# Patient Record
Sex: FEMALE | Race: WHITE | NOT HISPANIC OR LATINO | Employment: FULL TIME | URBAN - METROPOLITAN AREA
[De-identification: names, ages, dates, MRNs, and addresses within clinical notes are randomized per-mention and may not be internally consistent; named-entity substitution may affect disease eponyms.]

---

## 2017-07-21 ENCOUNTER — HOSPITAL ENCOUNTER (EMERGENCY)
Facility: HOSPITAL | Age: 22
Discharge: HOME | End: 2017-07-21
Attending: EMERGENCY MEDICINE
Payer: MEDICAID

## 2017-07-21 VITALS
DIASTOLIC BLOOD PRESSURE: 73 MMHG | BODY MASS INDEX: 19.7 KG/M2 | WEIGHT: 130 LBS | TEMPERATURE: 99 F | RESPIRATION RATE: 16 BRPM | HEART RATE: 97 BPM | HEIGHT: 68 IN | SYSTOLIC BLOOD PRESSURE: 140 MMHG | OXYGEN SATURATION: 100 %

## 2017-07-21 DIAGNOSIS — A60.00 GENITAL HERPES SIMPLEX, UNSPECIFIED SITE: Primary | ICD-10-CM

## 2017-07-21 PROCEDURE — 99283 EMERGENCY DEPT VISIT LOW MDM: CPT

## 2017-07-21 RX ORDER — ACYCLOVIR 200 MG/1
200 CAPSULE ORAL
Qty: 50 CAPSULE | Refills: 0 | Status: SHIPPED | OUTPATIENT
Start: 2017-07-21 | End: 2018-05-27

## 2017-07-22 NOTE — ED PROVIDER NOTES
"Encounter Date: 7/21/2017       History     Chief Complaint   Patient presents with    Pelvic Pain     Hx of genital herpes and fears another outbreak.  Just finished with an outbreak and has no medication      Patient is a 21 year old female who presents for herpes outbreak. She reports PMH significant for genital herpes. She states she is currently 5-6 weeks pregnant. She states she recently finished a course of acyclovir "a few weeks ago" and is now having another outbreak. She reports associated tenderness to the area. She denied any abdominal pain, nausea, vomiting, vaginal discharge, vaginal bleeding, dysuria or urinary frequency.       The history is provided by the patient.     Review of patient's allergies indicates:  No Known Allergies  Past Medical History:   Diagnosis Date    Herpes genitalis in women      History reviewed. No pertinent surgical history.  History reviewed. No pertinent family history.  Social History   Substance Use Topics    Smoking status: Never Smoker    Smokeless tobacco: Never Used    Alcohol use No     Review of Systems   Constitutional: Negative for chills and fever.   HENT: Negative for congestion and sore throat.    Respiratory: Negative for cough and shortness of breath.    Cardiovascular: Negative for chest pain.   Gastrointestinal: Negative for abdominal pain, diarrhea, nausea and vomiting.   Genitourinary: Positive for genital sores. Negative for dysuria, frequency, vaginal bleeding, vaginal discharge and vaginal pain.   Musculoskeletal: Negative for back pain.   Skin: Negative for rash.   Neurological: Negative for weakness.   Hematological: Does not bruise/bleed easily.       Physical Exam     Initial Vitals [07/21/17 2239]   BP Pulse Resp Temp SpO2   (!) 140/73 97 16 98.5 °F (36.9 °C) 100 %      MAP       95.33         Physical Exam    Nursing note and vitals reviewed.  Constitutional: She appears well-developed and well-nourished. No distress.   HENT:   Head: " Normocephalic and atraumatic.   Right Ear: External ear normal.   Left Ear: External ear normal.   Nose: Nose normal.   Eyes: Conjunctivae are normal. Pupils are equal, round, and reactive to light. Right eye exhibits no discharge. Left eye exhibits no discharge.   Neck: Normal range of motion. Neck supple.   Cardiovascular: Normal rate, regular rhythm and normal heart sounds. Exam reveals no gallop and no friction rub.    No murmur heard.  Pulmonary/Chest: Breath sounds normal. She has no wheezes. She has no rhonchi. She has no rales.   Abdominal: Soft. Normal appearance and bowel sounds are normal. There is no tenderness. There is no rigidity, no rebound and no guarding.   Genitourinary:       There is no rash, tenderness or lesion on the right labia. There is lesion on the left labia. There is no rash or tenderness on the left labia.   Musculoskeletal: Normal range of motion.   Neurological: She is alert.   Skin: Skin is warm and dry.         ED Course   Procedures  Labs Reviewed - No data to display          Medical Decision Making:   History:   Old Medical Records: I decided to obtain old medical records.       APC / Resident Notes:   This is an emergent evaluation of a 18-year-old female who presents with genital lesions.  Physical exam consistent with genital herpes.  She is currently pregnant.  We'll treat with acyclovir.  She is instructed to follow-up with her OB/GYN and primary care provider. Discussed results with patient. Return precautions given. Patient is to follow up with their primary care provider. Case was discussed with Dr. Escalona who has evaluated the patient and is in agreement with the plan of care. All questions answered.                 ED Course     Clinical Impression:   The encounter diagnosis was Genital herpes simplex, unspecified site.                           Clotilde Parkinson PA-C  07/22/17 0146

## 2018-05-27 ENCOUNTER — HOSPITAL ENCOUNTER (EMERGENCY)
Facility: HOSPITAL | Age: 23
Discharge: HOME | End: 2018-05-27
Attending: EMERGENCY MEDICINE | Admitting: EMERGENCY MEDICINE
Payer: MEDICAID

## 2018-05-27 VITALS
BODY MASS INDEX: 29.82 KG/M2 | TEMPERATURE: 98 F | HEIGHT: 67 IN | RESPIRATION RATE: 18 BRPM | HEART RATE: 70 BPM | SYSTOLIC BLOOD PRESSURE: 110 MMHG | DIASTOLIC BLOOD PRESSURE: 58 MMHG | OXYGEN SATURATION: 97 % | WEIGHT: 190 LBS

## 2018-05-27 DIAGNOSIS — S20.219A CONTUSION OF RIB: ICD-10-CM
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
## 2018-05-27 LAB
B-HCG UR QL: NEGATIVE
BILIRUB UR QL STRIP: NEGATIVE
CLARITY UR: ABNORMAL
COLOR UR: YELLOW
CTP QC/QA: YES
GLUCOSE UR QL STRIP: NEGATIVE
HGB UR QL STRIP: NEGATIVE
KETONES UR QL STRIP: NEGATIVE
LEUKOCYTE ESTERASE UR QL STRIP: NEGATIVE
NITRITE UR QL STRIP: NEGATIVE
PH UR STRIP: 6 [PH] (ref 5–8)
PROT UR QL STRIP: NEGATIVE
SP GR UR STRIP: >=1.03 (ref 1–1.03)
URN SPEC COLLECT METH UR: ABNORMAL
UROBILINOGEN UR STRIP-ACNC: NEGATIVE EU/DL

## 2018-05-27 PROCEDURE — 63600175 PHARM REV CODE 636 W HCPCS: Performed by: EMERGENCY MEDICINE

## 2018-05-27 PROCEDURE — 99284 EMERGENCY DEPT VISIT MOD MDM: CPT | Mod: 25

## 2018-05-27 PROCEDURE — 81003 URINALYSIS AUTO W/O SCOPE: CPT

## 2018-05-27 PROCEDURE — 81025 URINE PREGNANCY TEST: CPT | Performed by: EMERGENCY MEDICINE

## 2018-05-27 PROCEDURE — 96372 THER/PROPH/DIAG INJ SC/IM: CPT

## 2018-05-27 RX ORDER — KETOROLAC TROMETHAMINE 30 MG/ML
30 INJECTION, SOLUTION INTRAMUSCULAR; INTRAVENOUS
Status: COMPLETED | OUTPATIENT
Start: 2018-05-27 | End: 2018-05-27

## 2018-05-27 RX ORDER — IBUPROFEN 800 MG/1
800 TABLET ORAL EVERY 6 HOURS PRN
Qty: 20 TABLET | Refills: 0 | Status: SHIPPED | OUTPATIENT
Start: 2018-05-27 | End: 2018-05-30

## 2018-05-27 RX ADMIN — KETOROLAC TROMETHAMINE 30 MG: 30 INJECTION, SOLUTION INTRAMUSCULAR at 10:05
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
## 2018-10-22 ENCOUNTER — HOSPITAL ENCOUNTER (EMERGENCY)
Facility: HOSPITAL | Age: 23
Discharge: HOME | End: 2018-10-22
Attending: EMERGENCY MEDICINE
Payer: MEDICAID

## 2018-10-22 VITALS
HEIGHT: 66 IN | DIASTOLIC BLOOD PRESSURE: 76 MMHG | SYSTOLIC BLOOD PRESSURE: 111 MMHG | TEMPERATURE: 98 F | BODY MASS INDEX: 24.11 KG/M2 | RESPIRATION RATE: 16 BRPM | OXYGEN SATURATION: 97 % | HEART RATE: 81 BPM | WEIGHT: 150 LBS

## 2018-10-22 DIAGNOSIS — T78.40XA ALLERGIC REACTION, INITIAL ENCOUNTER: Primary | ICD-10-CM

## 2018-10-22 PROCEDURE — 99284 EMERGENCY DEPT VISIT MOD MDM: CPT | Mod: 25

## 2018-10-22 PROCEDURE — 25000003 PHARM REV CODE 250: Performed by: EMERGENCY MEDICINE

## 2018-10-22 PROCEDURE — 63600175 PHARM REV CODE 636 W HCPCS: Performed by: EMERGENCY MEDICINE

## 2018-10-22 PROCEDURE — 96372 THER/PROPH/DIAG INJ SC/IM: CPT

## 2018-10-22 RX ORDER — DIPHENHYDRAMINE HCL 50 MG
50 CAPSULE ORAL
Status: COMPLETED | OUTPATIENT
Start: 2018-10-22 | End: 2018-10-22

## 2018-10-22 RX ORDER — FAMOTIDINE 20 MG/1
TABLET, FILM COATED ORAL
Status: DISPENSED
Start: 2018-10-22 | End: 2018-10-22

## 2018-10-22 RX ORDER — METHYLPREDNISOLONE SOD SUCC 125 MG
VIAL (EA) INJECTION
Status: DISPENSED
Start: 2018-10-22 | End: 2018-10-22

## 2018-10-22 RX ORDER — FAMOTIDINE 20 MG/1
40 TABLET, FILM COATED ORAL
Status: COMPLETED | OUTPATIENT
Start: 2018-10-22 | End: 2018-10-22

## 2018-10-22 RX ORDER — DIPHENHYDRAMINE HCL 50 MG
CAPSULE ORAL
Status: DISPENSED
Start: 2018-10-22 | End: 2018-10-22

## 2018-10-22 RX ORDER — PREDNISONE 20 MG/1
20 TABLET ORAL 2 TIMES DAILY
Qty: 6 TABLET | Refills: 0 | Status: SHIPPED | OUTPATIENT
Start: 2018-10-22 | End: 2018-10-27

## 2018-10-22 RX ORDER — METHYLPREDNISOLONE SOD SUCC 125 MG
125 VIAL (EA) INJECTION
Status: COMPLETED | OUTPATIENT
Start: 2018-10-22 | End: 2018-10-22

## 2018-10-22 RX ADMIN — DIPHENHYDRAMINE HYDROCHLORIDE 50 MG: 50 CAPSULE ORAL at 12:10

## 2018-10-22 RX ADMIN — METHYLPREDNISOLONE SODIUM SUCCINATE 125 MG: 125 INJECTION, POWDER, FOR SOLUTION INTRAMUSCULAR; INTRAVENOUS at 12:10

## 2018-10-22 RX ADMIN — FAMOTIDINE 40 MG: 20 TABLET ORAL at 12:10

## 2018-10-22 NOTE — ED PROVIDER NOTES
Encounter Date: 10/22/2018    SCRIBE #1 NOTE: IKaren, sera scribing for, and in the presence of, Doug Jackson III, MD.       History     Chief Complaint   Patient presents with    Allergic Reaction       Time seen by provider: 12:20 AM on 10/22/2018    Kayla Marie Schoenberger is a 23 y.o. female who presents to the ED for evaluation of an allergic reaction. 3 hours ago, the pt developed a diffuse rash which was not relieved by benadryl or calamine lotion. She denies taking aspirin, advil, or any new medications.   The patient denies trouble swallowing, SOB, or any other symptoms at this time. PMHx: Herpes Genitalis. No SHx noted. NKDA.       The history is provided by the patient.     Review of patient's allergies indicates:  No Known Allergies  Past Medical History:   Diagnosis Date    Herpes genitalis in women      History reviewed. No pertinent surgical history.  History reviewed. No pertinent family history.  Social History     Tobacco Use    Smoking status: Never Smoker    Smokeless tobacco: Never Used   Substance Use Topics    Alcohol use: No    Drug use: No     Review of Systems   Constitutional: Negative for activity change, appetite change, chills, fatigue and fever.   HENT: Negative for trouble swallowing.    Eyes: Negative for visual disturbance.   Respiratory: Negative for apnea and shortness of breath.    Cardiovascular: Negative for chest pain and palpitations.   Gastrointestinal: Negative for abdominal distention and abdominal pain.   Genitourinary: Negative for difficulty urinating.   Musculoskeletal: Negative for neck pain.   Skin: Positive for rash. Negative for pallor.   Neurological: Negative for headaches.   Hematological: Does not bruise/bleed easily.   Psychiatric/Behavioral: Negative for agitation.       Physical Exam     Initial Vitals [10/22/18 0015]   BP Pulse Resp Temp SpO2   111/76 81 16 98.2 °F (36.8 °C) 97 %      MAP       --         Physical Exam    Nursing note  and vitals reviewed.  Constitutional: She appears well-developed and well-nourished.   HENT:   Head: Normocephalic and atraumatic.   Eyes: Conjunctivae are normal.   Neck: Normal range of motion. Neck supple.   Cardiovascular: Normal rate, regular rhythm and normal heart sounds. Exam reveals no gallop and no friction rub.    No murmur heard.  Pulmonary/Chest: Effort normal and breath sounds normal. No respiratory distress. She has no wheezes. She has no rhonchi. She has no rales.   Musculoskeletal: Normal range of motion.   Neurological: She is oriented to person, place, and time.   Skin: Skin is warm and dry. Rash noted. Rash is urticarial. No erythema.   Diffuse urticarial rash noted.    Psychiatric: She has a normal mood and affect.         ED Course   Procedures  Labs Reviewed - No data to display       Imaging Results    None          Medical Decision Making:   History:   Old Medical Records: I decided to obtain old medical records.  ED Management:  23-year-old female presents with a diffuse pruritic urticarial rash that began within an hour of eating.  The symptoms most likely reflect a dietary allergy.  There is no evidence of systemic involvement with no airway involvement.  She is treated empirically with Benadryl, Pepcid and Solu-Medrol.       APC / Resident Notes:   I, Dr. Doug Jackson III, personally performed the services described in this documentation. All medical record entries made by the scribe were at my direction and in my presence.  I have reviewed the chart and agree that the record reflects my personal performance and is accurate and complete. Doug Jackson III, MD.  4:04 AM 10/22/2018       Scribe Attestation:   Scribe #1: I performed the above scribed service and the documentation accurately describes the services I performed. I attest to the accuracy of the note.               Clinical Impression:     1. Allergic reaction, initial encounter        Disposition:   Disposition:  Discharged  Condition: Stable                        Doug Jackson III, MD  10/22/18 9722

## 2018-10-22 NOTE — ED NOTES
Patient identifiers for Kayla Marie Schoenberger checked and correct.  LOC:  Patient is awake, alert, and aware of environment with an appropriate affect. Patient is oriented x 3 and speaking appropriately.  APPEARANCE:  Patient resting comfortably and in no acute distress. Patient is clean and well groomed, patient's clothing is properly fastened.  SKIN:  The skin is warm and dry. Patient has normal skin turgor and moist mucus membranes. Skin is intact; no bruising or breakdown noted.  Pt with noted scattered red rash over trunk, arms and legs.  Pt endorses sudden onset this evening but improved with 1 benadryl.  But pt wne to sleep and woke up with it even worse.  Pt endorses severe itching.    MUSCULOSKELETAL:  Patient is moving all extremities well, no obvious deformities noted. Pulses intact.   RESPIRATORY:  Airway is open and patent. Respirations are spontaneous and non-labored with normal effort and rate. Pt denies SOB, Wheezing.   CARDIAC:  Patient has a normal rate and rhythm. No peripheral edema noted. Capillary refill < 3 seconds.  ABDOMEN:  No distention noted.  Soft and non-tender upon palpation.  NEUROLOGICAL:  PERRL. Facial expression is symmetrical. Hand grasps are equal bilaterally. Normal sensation in all extremities when touched with finger.  Allergies reported:  Review of patient's allergies indicates:  No Known Allergies  OTHER NOTES:  Pt denies previous allergies and has not come in contact with anything new to her knowledge

## 2019-12-25 ENCOUNTER — HOSPITAL ENCOUNTER (EMERGENCY)
Facility: HOSPITAL | Age: 24
Discharge: HOME | End: 2019-12-25
Attending: EMERGENCY MEDICINE
Payer: MEDICAID

## 2019-12-25 VITALS
OXYGEN SATURATION: 100 % | SYSTOLIC BLOOD PRESSURE: 114 MMHG | DIASTOLIC BLOOD PRESSURE: 62 MMHG | TEMPERATURE: 99 F | WEIGHT: 130 LBS | BODY MASS INDEX: 20.98 KG/M2 | HEART RATE: 99 BPM | RESPIRATION RATE: 16 BRPM

## 2019-12-25 DIAGNOSIS — R52 PAIN: ICD-10-CM
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
## 2019-12-25 DIAGNOSIS — R10.84 GENERALIZED ABDOMINAL PAIN: ICD-10-CM

## 2019-12-25 LAB
ALBUMIN SERPL BCP-MCNC: 3.6 G/DL (ref 3.5–5.2)
ALP SERPL-CCNC: 33 U/L (ref 55–135)
ALT SERPL W/O P-5'-P-CCNC: 15 U/L (ref 10–44)
ANION GAP SERPL CALC-SCNC: 5 MMOL/L (ref 8–16)
AST SERPL-CCNC: 24 U/L (ref 10–40)
B-HCG UR QL: POSITIVE
BACTERIA #/AREA URNS HPF: ABNORMAL /HPF
BASOPHILS # BLD AUTO: 0.04 K/UL (ref 0–0.2)
BASOPHILS NFR BLD: 0.6 % (ref 0–1.9)
BILIRUB SERPL-MCNC: 0.7 MG/DL (ref 0.1–1)
BILIRUB UR QL STRIP: NEGATIVE
BUN SERPL-MCNC: 7 MG/DL (ref 6–20)
CALCIUM SERPL-MCNC: 8.7 MG/DL (ref 8.7–10.5)
CHLORIDE SERPL-SCNC: 106 MMOL/L (ref 95–110)
CLARITY UR: ABNORMAL
CO2 SERPL-SCNC: 23 MMOL/L (ref 23–29)
COLOR UR: YELLOW
CREAT SERPL-MCNC: 0.6 MG/DL (ref 0.5–1.4)
CTP QC/QA: YES
DIFFERENTIAL METHOD: ABNORMAL
EOSINOPHIL # BLD AUTO: 0 K/UL (ref 0–0.5)
EOSINOPHIL NFR BLD: 0.2 % (ref 0–8)
ERYTHROCYTE [DISTWIDTH] IN BLOOD BY AUTOMATED COUNT: 13.6 % (ref 11.5–14.5)
EST. GFR  (AFRICAN AMERICAN): >60 ML/MIN/1.73 M^2
EST. GFR  (NON AFRICAN AMERICAN): >60 ML/MIN/1.73 M^2
GLUCOSE SERPL-MCNC: 91 MG/DL (ref 70–110)
GLUCOSE UR QL STRIP: ABNORMAL
GRAN CASTS #/AREA URNS LPF: 1 /LPF
HCG INTACT+B SERPL-ACNC: NORMAL MIU/ML
HCT VFR BLD AUTO: 37 % (ref 37–48.5)
HGB BLD-MCNC: 12.3 G/DL (ref 12–16)
HGB UR QL STRIP: ABNORMAL
HYALINE CASTS #/AREA URNS LPF: 165 /LPF
IMM GRANULOCYTES # BLD AUTO: 0.02 K/UL (ref 0–0.04)
IMM GRANULOCYTES NFR BLD AUTO: 0.3 % (ref 0–0.5)
KETONES UR QL STRIP: NEGATIVE
LEUKOCYTE ESTERASE UR QL STRIP: ABNORMAL
LYMPHOCYTES # BLD AUTO: 1.1 K/UL (ref 1–4.8)
LYMPHOCYTES NFR BLD: 16.9 % (ref 18–48)
MCH RBC QN AUTO: 30 PG (ref 27–31)
MCHC RBC AUTO-ENTMCNC: 33.2 G/DL (ref 32–36)
MCV RBC AUTO: 90 FL (ref 82–98)
MICROSCOPIC COMMENT: ABNORMAL
MONOCYTES # BLD AUTO: 0.5 K/UL (ref 0.3–1)
MONOCYTES NFR BLD: 7.8 % (ref 4–15)
NEUTROPHILS # BLD AUTO: 4.9 K/UL (ref 1.8–7.7)
NEUTROPHILS NFR BLD: 74.2 % (ref 38–73)
NITRITE UR QL STRIP: NEGATIVE
NRBC BLD-RTO: 0 /100 WBC
PH UR STRIP: 7 [PH] (ref 5–8)
PLATELET # BLD AUTO: 210 K/UL (ref 150–350)
PMV BLD AUTO: 9.9 FL (ref 9.2–12.9)
POTASSIUM SERPL-SCNC: 3.6 MMOL/L (ref 3.5–5.1)
PROT SERPL-MCNC: 6.8 G/DL (ref 6–8.4)
PROT UR QL STRIP: ABNORMAL
RBC # BLD AUTO: 4.1 M/UL (ref 4–5.4)
RBC #/AREA URNS HPF: 7 /HPF (ref 0–4)
RH BLD: NORMAL
SODIUM SERPL-SCNC: 134 MMOL/L (ref 136–145)
SP GR UR STRIP: 1.02 (ref 1–1.03)
SQUAMOUS #/AREA URNS HPF: 9 /HPF
URN SPEC COLLECT METH UR: ABNORMAL
UROBILINOGEN UR STRIP-ACNC: NEGATIVE EU/DL
WBC # BLD AUTO: 6.58 K/UL (ref 3.9–12.7)
WBC #/AREA URNS HPF: 80 /HPF (ref 0–5)

## 2019-12-25 PROCEDURE — 99284 EMERGENCY DEPT VISIT MOD MDM: CPT | Mod: 25

## 2019-12-25 PROCEDURE — 80053 COMPREHEN METABOLIC PANEL: CPT

## 2019-12-25 PROCEDURE — 81001 URINALYSIS AUTO W/SCOPE: CPT

## 2019-12-25 PROCEDURE — 84702 CHORIONIC GONADOTROPIN TEST: CPT

## 2019-12-25 PROCEDURE — 81025 URINE PREGNANCY TEST: CPT | Performed by: EMERGENCY MEDICINE

## 2019-12-25 PROCEDURE — 85025 COMPLETE CBC W/AUTO DIFF WBC: CPT

## 2019-12-25 PROCEDURE — 36415 COLL VENOUS BLD VENIPUNCTURE: CPT

## 2019-12-25 PROCEDURE — 86901 BLOOD TYPING SEROLOGIC RH(D): CPT

## 2019-12-25 NOTE — DISCHARGE INSTRUCTIONS
Return for severe abdominal pain weakness nausea vomiting.  Please return if any vaginal bleeding drainage from the vagina or any other problem.  Please follow up with an OBGYN doctor in the next few days.
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
## 2020-01-29 ENCOUNTER — HOSPITAL ENCOUNTER (EMERGENCY)
Facility: HOSPITAL | Age: 25
Discharge: HOME | End: 2020-01-29
Attending: EMERGENCY MEDICINE
Payer: MEDICAID

## 2020-01-29 VITALS
TEMPERATURE: 99 F | BODY MASS INDEX: 24.11 KG/M2 | WEIGHT: 150 LBS | HEART RATE: 95 BPM | RESPIRATION RATE: 20 BRPM | DIASTOLIC BLOOD PRESSURE: 73 MMHG | SYSTOLIC BLOOD PRESSURE: 124 MMHG | HEIGHT: 66 IN | OXYGEN SATURATION: 100 %

## 2020-01-29 DIAGNOSIS — N39.0 URINARY TRACT INFECTION WITHOUT HEMATURIA, SITE UNSPECIFIED: Primary | ICD-10-CM

## 2020-01-29 LAB
ABO + RH BLD: NORMAL
ALBUMIN SERPL BCP-MCNC: 3.7 G/DL (ref 3.5–5.2)
ALP SERPL-CCNC: 36 U/L (ref 55–135)
ALT SERPL W/O P-5'-P-CCNC: 12 U/L (ref 10–44)
AMYLASE SERPL-CCNC: 61 U/L (ref 20–110)
ANION GAP SERPL CALC-SCNC: 10 MMOL/L (ref 8–16)
AST SERPL-CCNC: 16 U/L (ref 10–40)
B-HCG UR QL: POSITIVE
BACTERIA #/AREA URNS HPF: ABNORMAL /HPF
BASOPHILS # BLD AUTO: 0.04 K/UL (ref 0–0.2)
BASOPHILS NFR BLD: 0.3 % (ref 0–1.9)
BILIRUB SERPL-MCNC: 0.3 MG/DL (ref 0.1–1)
BILIRUB UR QL STRIP: NEGATIVE
BUN SERPL-MCNC: 9 MG/DL (ref 6–20)
CALCIUM SERPL-MCNC: 9.3 MG/DL (ref 8.7–10.5)
CHLORIDE SERPL-SCNC: 103 MMOL/L (ref 95–110)
CLARITY UR: ABNORMAL
CO2 SERPL-SCNC: 22 MMOL/L (ref 23–29)
COLOR UR: YELLOW
CREAT SERPL-MCNC: 0.6 MG/DL (ref 0.5–1.4)
CTP QC/QA: YES
DIFFERENTIAL METHOD: ABNORMAL
EOSINOPHIL # BLD AUTO: 0 K/UL (ref 0–0.5)
EOSINOPHIL NFR BLD: 0.1 % (ref 0–8)
ERYTHROCYTE [DISTWIDTH] IN BLOOD BY AUTOMATED COUNT: 13.3 % (ref 11.5–14.5)
EST. GFR  (AFRICAN AMERICAN): >60 ML/MIN/1.73 M^2
EST. GFR  (NON AFRICAN AMERICAN): >60 ML/MIN/1.73 M^2
GLUCOSE SERPL-MCNC: 123 MG/DL (ref 70–110)
GLUCOSE UR QL STRIP: NEGATIVE
HCG INTACT+B SERPL-ACNC: NORMAL MIU/ML
HCG INTACT+B SERPL-ACNC: NORMAL MIU/ML
HCT VFR BLD AUTO: 39.3 % (ref 37–48.5)
HGB BLD-MCNC: 13.3 G/DL (ref 12–16)
HGB UR QL STRIP: NEGATIVE
HYALINE CASTS #/AREA URNS LPF: 12 /LPF
IMM GRANULOCYTES # BLD AUTO: 0.05 K/UL (ref 0–0.04)
IMM GRANULOCYTES NFR BLD AUTO: 0.4 % (ref 0–0.5)
KETONES UR QL STRIP: NEGATIVE
LEUKOCYTE ESTERASE UR QL STRIP: ABNORMAL
LIPASE SERPL-CCNC: 33 U/L (ref 4–60)
LYMPHOCYTES # BLD AUTO: 1 K/UL (ref 1–4.8)
LYMPHOCYTES NFR BLD: 8.9 % (ref 18–48)
MCH RBC QN AUTO: 30.8 PG (ref 27–31)
MCHC RBC AUTO-ENTMCNC: 33.8 G/DL (ref 32–36)
MCV RBC AUTO: 91 FL (ref 82–98)
MICROSCOPIC COMMENT: ABNORMAL
MONOCYTES # BLD AUTO: 0.5 K/UL (ref 0.3–1)
MONOCYTES NFR BLD: 4.1 % (ref 4–15)
NEUTROPHILS # BLD AUTO: 10.1 K/UL (ref 1.8–7.7)
NEUTROPHILS NFR BLD: 86.2 % (ref 38–73)
NITRITE UR QL STRIP: NEGATIVE
NRBC BLD-RTO: 0 /100 WBC
PH UR STRIP: 8 [PH] (ref 5–8)
PLATELET # BLD AUTO: 233 K/UL (ref 150–350)
PMV BLD AUTO: 9.8 FL (ref 9.2–12.9)
POTASSIUM SERPL-SCNC: 3.8 MMOL/L (ref 3.5–5.1)
PROT SERPL-MCNC: 7.4 G/DL (ref 6–8.4)
PROT UR QL STRIP: NEGATIVE
RBC # BLD AUTO: 4.32 M/UL (ref 4–5.4)
RBC #/AREA URNS HPF: 0 /HPF (ref 0–4)
SODIUM SERPL-SCNC: 135 MMOL/L (ref 136–145)
SP GR UR STRIP: 1.01 (ref 1–1.03)
SQUAMOUS #/AREA URNS HPF: 14 /HPF
URN SPEC COLLECT METH UR: ABNORMAL
UROBILINOGEN UR STRIP-ACNC: NEGATIVE EU/DL
WBC # BLD AUTO: 11.75 K/UL (ref 3.9–12.7)
WBC #/AREA URNS HPF: 25 /HPF (ref 0–5)

## 2020-01-29 PROCEDURE — 87210 SMEAR WET MOUNT SALINE/INK: CPT

## 2020-01-29 PROCEDURE — 86901 BLOOD TYPING SEROLOGIC RH(D): CPT

## 2020-01-29 PROCEDURE — 99284 EMERGENCY DEPT VISIT MOD MDM: CPT | Mod: 25

## 2020-01-29 PROCEDURE — 81025 URINE PREGNANCY TEST: CPT | Performed by: EMERGENCY MEDICINE

## 2020-01-29 PROCEDURE — 87491 CHLMYD TRACH DNA AMP PROBE: CPT

## 2020-01-29 PROCEDURE — 63600175 PHARM REV CODE 636 W HCPCS: Performed by: EMERGENCY MEDICINE

## 2020-01-29 PROCEDURE — 85025 COMPLETE CBC W/AUTO DIFF WBC: CPT

## 2020-01-29 PROCEDURE — 80053 COMPREHEN METABOLIC PANEL: CPT

## 2020-01-29 PROCEDURE — 36415 COLL VENOUS BLD VENIPUNCTURE: CPT

## 2020-01-29 PROCEDURE — 87205 SMEAR GRAM STAIN: CPT

## 2020-01-29 PROCEDURE — 87086 URINE CULTURE/COLONY COUNT: CPT

## 2020-01-29 PROCEDURE — 83690 ASSAY OF LIPASE: CPT

## 2020-01-29 PROCEDURE — 96361 HYDRATE IV INFUSION ADD-ON: CPT

## 2020-01-29 PROCEDURE — 82150 ASSAY OF AMYLASE: CPT

## 2020-01-29 PROCEDURE — 84702 CHORIONIC GONADOTROPIN TEST: CPT

## 2020-01-29 PROCEDURE — 96374 THER/PROPH/DIAG INJ IV PUSH: CPT

## 2020-01-29 PROCEDURE — 87081 CULTURE SCREEN ONLY: CPT | Mod: 59

## 2020-01-29 PROCEDURE — 81001 URINALYSIS AUTO W/SCOPE: CPT

## 2020-01-29 RX ORDER — METOCLOPRAMIDE HYDROCHLORIDE 5 MG/ML
10 INJECTION INTRAMUSCULAR; INTRAVENOUS
Status: COMPLETED | OUTPATIENT
Start: 2020-01-29 | End: 2020-01-29

## 2020-01-29 RX ORDER — CEPHALEXIN 500 MG/1
500 CAPSULE ORAL 4 TIMES DAILY
Qty: 40 CAPSULE | Refills: 0 | Status: SHIPPED | OUTPATIENT
Start: 2020-01-29 | End: 2020-02-03

## 2020-01-29 RX ORDER — METOCLOPRAMIDE 10 MG/1
10 TABLET ORAL EVERY 6 HOURS
Qty: 30 TABLET | Refills: 0 | Status: SHIPPED | OUTPATIENT
Start: 2020-01-29 | End: 2020-07-01 | Stop reason: CLARIF

## 2020-01-29 RX ADMIN — METOCLOPRAMIDE 10 MG: 5 INJECTION, SOLUTION INTRAMUSCULAR; INTRAVENOUS at 06:01

## 2020-01-29 RX ADMIN — SODIUM CHLORIDE 1000 ML: 0.9 INJECTION, SOLUTION INTRAVENOUS at 05:01

## 2020-01-29 NOTE — ED PROVIDER NOTES
"Encounter Date: 2020       History     Chief Complaint   Patient presents with    Abdominal Pain     LOWER, ONSET TODAY    Vaginal Discharge     APPROX 12- 14 WEEKS PREG, VAG D/C FOR "AWHILE"     Patient here with reported lower abdominal pain onset today no vaginal bleeding noted no spotting patient does complain of a vaginal discharge which she has had for sometimes describes it as white tannish she denies any nausea vomiting she reports lower abdominal cramping some mild dysuria times patient is         Review of patient's allergies indicates:  No Known Allergies  Past Medical History:   Diagnosis Date    Hard of hearing     Herpes genitalis in women      History reviewed. No pertinent surgical history.  No family history on file.  Social History     Tobacco Use    Smoking status: Never Smoker    Smokeless tobacco: Never Used   Substance Use Topics    Alcohol use: No    Drug use: No     Review of Systems   Constitutional: Negative.    HENT: Negative.    Eyes: Negative.    Respiratory: Negative.    Cardiovascular: Negative.    Gastrointestinal: Positive for abdominal pain. Negative for diarrhea, nausea and vomiting.   Endocrine: Negative.    Genitourinary: Positive for dysuria, pelvic pain and vaginal discharge. Negative for decreased urine volume, vaginal bleeding and vaginal pain.   Musculoskeletal: Negative.    Skin: Negative.    Allergic/Immunologic: Negative.    Neurological: Negative.    Hematological: Negative.    Psychiatric/Behavioral: Negative.        Physical Exam     Initial Vitals [20 1445]   BP Pulse Resp Temp SpO2   (!) 146/67 93 20 97.4 °F (36.3 °C) 100 %      MAP       --         Physical Exam    Constitutional: She appears well-developed and well-nourished. No distress.   HENT:   Head: Normocephalic and atraumatic.   Right Ear: External ear normal.   Left Ear: External ear normal.   Mouth/Throat: Oropharynx is clear and moist.   Eyes: Conjunctivae and EOM are normal. Pupils " are equal, round, and reactive to light.   Neck: Normal range of motion. Neck supple.   Cardiovascular: Normal rate, regular rhythm, normal heart sounds and intact distal pulses.   Pulmonary/Chest: Breath sounds normal.   Abdominal: Soft. Bowel sounds are normal. There is no tenderness.   Genitourinary: Pelvic exam was performed with patient supine. There is no rash or lesion on the right labia. There is no rash or lesion on the left labia. No erythema or bleeding in the vagina.   Genitourinary Comments: Patient's ultrasound showed evidence of placenta previa with an otherwise normal intrauterine pregnancy given this and the lack of bleeding I did not use a speculum for the exam performed intra vaginal swabs only also postpone bimanual exam to be performed by her gynecologist   Musculoskeletal: Normal range of motion. She exhibits no edema.   Neurological: She is alert and oriented to person, place, and time. She has normal strength. GCS score is 15. GCS eye subscore is 4. GCS verbal subscore is 5. GCS motor subscore is 6.   Skin: Skin is warm and dry. Capillary refill takes less than 2 seconds.   Psychiatric: She has a normal mood and affect. Her behavior is normal.         ED Course   Procedures  Labs Reviewed   CBC W/ AUTO DIFFERENTIAL - Abnormal; Notable for the following components:       Result Value    Gran # (ANC) 10.1 (*)     Immature Grans (Abs) 0.05 (*)     Gran% 86.2 (*)     Lymph% 8.9 (*)     All other components within normal limits   COMPREHENSIVE METABOLIC PANEL - Abnormal; Notable for the following components:    Sodium 135 (*)     CO2 22 (*)     Glucose 123 (*)     Alkaline Phosphatase 36 (*)     All other components within normal limits   POCT URINE PREGNANCY - Abnormal; Notable for the following components:    POC Preg Test, Ur Positive (*)     All other components within normal limits   HCG, QUANTITATIVE, PREGNANCY   URINALYSIS, REFLEX TO URINE CULTURE   HCG, QUANTITATIVE, PREGNANCY   AMYLASE    LIPASE   URINALYSIS MICROSCOPIC   GROUP & RH          Imaging Results          US OB Less Than 14 Wks First Gestation (Final result)  Result time 01/29/20 16:58:58   Procedure changed from US OB Less Than 14 Wks with Transvag(xpd     Final result by Jhony Clarke MD (01/29/20 16:58:58)                 Impression:      1. Single live intrauterine gestation with an average ultrasound age of 14 weeks 2 days +/-1 week 0 days with fetal heart tones identified at 166 beats per minute.    2.  Placenta previa (the placenta covers the internal cervical os), consider interval follow-up to document placental migration.    3.  Likely corpus luteal cyst in the left ovary.    4.  Nonvisualization of the right ovary.    RESULT NOTIFICATION: These observations were discussed by the dictating physician, by phone with, and acknowledged by Tere Resendiz at 16:56 on 1/29/2020.      Electronically signed by: Jhony Clarke MD  Date:    01/29/2020  Time:    16:58             Narrative:    CLINICAL HISTORY:  ([id number])23 y/o  (1995) F    Vag Bleeding;    TECHNIQUE:  (A#[id number], exam time 1/29/2020 16:52)    US OB <14 WEEKS TRANSABDOM, SINGLE GESTATION [id number]    An obstetrical ultrasound was performed using grayscale, M-mode, and color flow Doppler.    COMPARISON:  Ultrasound most recently from 12/25/2019    FINDINGS:  Fetus: Single, live, intrauterine, cephalic position    Placenta:    Location: Overlying the cervical os.    Cord insertion: The placental cord insertion was not well visualized.    Amniotic fluid index: Normal    Fetal heart tones are identified at 166 beats per minute.    BIOMETRY:    Biparietal diameter 2.6 cm (14 weeks 4 days)    Head circumference 9.9 cm (14 weeks 4 days)    Abdomen circumference 7.4 cm (13 weeks 6 days)    Femur length 1.3 cm (13 weeks 6 days)    The average ultrasound age is 14 weeks 2 days +/-1 week.    HC/AC 1.34, within the normal range for the gestational age.    Estimated fetal  weight of 86 g +/-13 g.    MATERNAL STRUCTURES:    Ovaries/adnexa: The left ovary measures 2.5 x 2.3 x 1.8 cm with a hypoechoic structure measuring 1.6 cm in diameter with peripheral hyperemia consistent with an involuting corpus luteal cyst.  The right ovary is not seen.    Cervix: Closed, measuring 3.3 cm in length.    Cul-de-sac: No abnormal fluid.                                 Medical Decision Making:   ED Management:  Patient's wet prep Gram stain were within normal limits urinalysis does show evidence of urinary tract infection will treat with outpatient antibiotics I will give her prescription for Reglan and Keflex follow-up with Dr. Killian                   ED Course as of Jan 29 1722   Wed Jan 29, 2020   1558 C?o lower abdominal cramping strated earlier today NO vag bleeding    [MP]      ED Course User Index  [MP] VIRGIE Partida                Clinical Impression:       ICD-10-CM ICD-9-CM   1. Urinary tract infection without hematuria, site unspecified N39.0 599.0                             Roman Allen MD  01/29/20 1945

## 2020-01-30 LAB
SPECIMEN SOURCE: NORMAL
T VAGINALIS GENITAL QL WET PREP: NORMAL
YEAST GENITAL QL WET PREP: NORMAL

## 2020-02-01 LAB — BACTERIA UR CULT: NO GROWTH

## 2020-02-02 LAB
BACTERIA GENITAL AEROBE CULT: NORMAL
GRAM STN SPEC: NORMAL

## 2020-02-04 LAB
CHLAMYDIA, AMPLIFIED DNA: POSITIVE
N GONORRHOEAE, AMPLIFIED DNA: POSITIVE

## 2020-03-05 LAB
C TRACH RRNA SPEC QL PROBE: POSITIVE
N GONORRHOEAE, AMPLIFIED DNA: NEGATIVE

## 2020-06-22 ENCOUNTER — HOSPITAL ENCOUNTER (OUTPATIENT)
Facility: HOSPITAL | Age: 25
Discharge: HOME | End: 2020-06-22
Attending: OBSTETRICS & GYNECOLOGY | Admitting: OBSTETRICS & GYNECOLOGY
Payer: MEDICAID

## 2020-06-22 VITALS
WEIGHT: 154 LBS | HEART RATE: 101 BPM | TEMPERATURE: 98 F | RESPIRATION RATE: 18 BRPM | BODY MASS INDEX: 24.75 KG/M2 | SYSTOLIC BLOOD PRESSURE: 129 MMHG | HEIGHT: 66 IN | DIASTOLIC BLOOD PRESSURE: 66 MMHG

## 2020-06-22 DIAGNOSIS — O47.9 UTERINE CONTRACTIONS DURING PREGNANCY: ICD-10-CM

## 2020-06-22 LAB
AMPHET+METHAMPHET UR QL: NEGATIVE
BACTERIA #/AREA URNS HPF: ABNORMAL /HPF
BARBITURATES UR QL SCN>200 NG/ML: NEGATIVE
BENZODIAZ UR QL SCN>200 NG/ML: NEGATIVE
BILIRUB UR QL STRIP: NEGATIVE
BZE UR QL SCN: NEGATIVE
CANNABINOIDS UR QL SCN: NEGATIVE
CLARITY UR: ABNORMAL
COLOR UR: YELLOW
CREAT UR-MCNC: 66 MG/DL (ref 15–325)
GLUCOSE UR QL STRIP: NEGATIVE
HGB UR QL STRIP: NEGATIVE
HYALINE CASTS #/AREA URNS LPF: 8 /LPF
KETONES UR QL STRIP: NEGATIVE
LEUKOCYTE ESTERASE UR QL STRIP: ABNORMAL
MICROSCOPIC COMMENT: ABNORMAL
NITRITE UR QL STRIP: NEGATIVE
OPIATES UR QL SCN: NEGATIVE
PCP UR QL SCN>25 NG/ML: NEGATIVE
PCP UR QL SCN>25 NG/ML: NEGATIVE
PH UR STRIP: 7 [PH] (ref 5–8)
PROT UR QL STRIP: NEGATIVE
RBC #/AREA URNS HPF: 1 /HPF (ref 0–4)
SP GR UR STRIP: 1 (ref 1–1.03)
SQUAMOUS #/AREA URNS HPF: 4 /HPF
TOXICOLOGY INFORMATION: NORMAL
URN SPEC COLLECT METH UR: ABNORMAL
UROBILINOGEN UR STRIP-ACNC: NEGATIVE EU/DL
WBC #/AREA URNS HPF: 23 /HPF (ref 0–5)

## 2020-06-22 PROCEDURE — 80307 DRUG TEST PRSMV CHEM ANLYZR: CPT

## 2020-06-22 PROCEDURE — 81001 URINALYSIS AUTO W/SCOPE: CPT | Mod: 59

## 2020-06-22 PROCEDURE — 87086 URINE CULTURE/COLONY COUNT: CPT

## 2020-06-22 PROCEDURE — 99211 OFF/OP EST MAY X REQ PHY/QHP: CPT

## 2020-06-22 NOTE — DISCHARGE INSTRUCTIONS
Keep your scheduled appointment with your provider.    Call your Doctor if you have any of the following:  Temperature above 100 degrees  Nausea, vomiting and/or diarrhea  Severe headache, dizziness, or blurred vision  Notable increase in swelling of hands or feet  Notable swelling of face and lips  Difficulty, pain or burning with urination  Foul smelling vaginal discharge  Decreased fetal movement    Come to the hospital if you have any of the following symptoms:  Your water breaks  More than 4-6 contractions in 1 hour for 2 or more hours  Vaginal bleeding like a period    After 28 weeks, you should feel 10 distinct fetal movements within a 2 hour period.    It is recommended that you drink 1/2 a gallon of water each day.  Tea, Soda and Juice are  in addition to this.  Kick Counts    Its normal to worry about your babys health. One way you can know your babys doing well is to record the babys movements once a day. This is called a kick count. Remember to take your kick count records to all your appointments with your healthcare provider.  How to count kicks  Here are tips for counting kicks:  · Choose a time when the baby is active, such as after a meal.   · Sit comfortably or lie on your side.   · The first time the baby moves, write down the time.   · Count each movement until the baby has moved 10 times. This can take from 20 minutes to 2 hours.   · Try to do it at the same time each day.  When to call your healthcare provider  Call your healthcare provider right away if you notice any of the following:  · Your baby moves fewer than 10 times in 2 hours while youre doing kick counts.  · Your baby moves much less often than on the days before.  · You have not felt your baby move all day.  Date Last Reviewed: 8/5/2015  © 0423-8128 [employer]. [address] [place] [address], [place] [address]. All rights reserved. This information is not intended as a substitute for professional medical care. Always  follow your healthcare professional's instructions.

## 2020-06-22 NOTE — NURSING
Pt arrives to the unit from home c/o ctxs on & off all day. Pt denies any leaking of fluids or bleeding.  0220: message sent to Dr. Asher regarding pt complaints, SVE & u/a RESULTS. Awaiting callback  0233: ok to discharge pt home per Dr. Asher

## 2020-06-23 LAB
BACTERIA UR CULT: NORMAL
BACTERIA UR CULT: NORMAL

## 2020-07-01 ENCOUNTER — HOSPITAL ENCOUNTER (EMERGENCY)
Facility: HOSPITAL | Age: 25
Discharge: HOME | End: 2020-07-01
Attending: EMERGENCY MEDICINE
Payer: MEDICAID

## 2020-07-01 VITALS
BODY MASS INDEX: 24.41 KG/M2 | SYSTOLIC BLOOD PRESSURE: 120 MMHG | HEART RATE: 92 BPM | DIASTOLIC BLOOD PRESSURE: 69 MMHG | RESPIRATION RATE: 16 BRPM | OXYGEN SATURATION: 100 % | TEMPERATURE: 99 F | WEIGHT: 151.25 LBS

## 2020-07-01 DIAGNOSIS — L03.116 CELLULITIS OF LEFT LOWER EXTREMITY: Primary | ICD-10-CM

## 2020-07-01 PROCEDURE — 25000003 PHARM REV CODE 250: Performed by: EMERGENCY MEDICINE

## 2020-07-01 PROCEDURE — 99283 EMERGENCY DEPT VISIT LOW MDM: CPT

## 2020-07-01 RX ORDER — CLINDAMYCIN HYDROCHLORIDE 150 MG/1
300 CAPSULE ORAL 4 TIMES DAILY
Qty: 56 CAPSULE | Refills: 0 | Status: SHIPPED | OUTPATIENT
Start: 2020-07-01 | End: 2020-07-08

## 2020-07-01 RX ORDER — MUPIROCIN 20 MG/G
1 OINTMENT TOPICAL 3 TIMES DAILY
Status: DISCONTINUED | OUTPATIENT
Start: 2020-07-01 | End: 2020-07-01 | Stop reason: HOSPADM

## 2020-07-01 RX ORDER — ACETAMINOPHEN 325 MG/1
650 TABLET ORAL
Status: COMPLETED | OUTPATIENT
Start: 2020-07-01 | End: 2020-07-01

## 2020-07-01 RX ORDER — CLINDAMYCIN HYDROCHLORIDE 150 MG/1
300 CAPSULE ORAL
Status: COMPLETED | OUTPATIENT
Start: 2020-07-01 | End: 2020-07-01

## 2020-07-01 RX ADMIN — ACETAMINOPHEN 650 MG: 325 TABLET ORAL at 09:07

## 2020-07-01 RX ADMIN — CLINDAMYCIN HYDROCHLORIDE 300 MG: 150 CAPSULE ORAL at 09:07

## 2020-07-01 RX ADMIN — MUPIROCIN 22 G: 20 OINTMENT TOPICAL at 09:07

## 2020-07-02 NOTE — ED PROVIDER NOTES
Encounter Date: 7/1/2020       History     Chief Complaint   Patient presents with    Wound Check     left knee with redness, itching and burning; 8 mths pregnant     HPI   Patient is a 24-year-old woman who is approximately 8 months pregnant presents emergency department for 2 days of left anterior knee skin redness with itching and burning sensation.  She states the pain is moderate intensity.  She denies any associated fever.  Pain is worse with walking.  Review of patient's allergies indicates:  No Known Allergies  Past Medical History:   Diagnosis Date    Hard of hearing     Herpes genitalis in women      No past surgical history on file.  No family history on file.  Social History     Tobacco Use    Smoking status: Never Smoker    Smokeless tobacco: Never Used   Substance Use Topics    Alcohol use: No    Drug use: No     Review of Systems   Constitutional: Negative for fever.   HENT: Negative for sore throat.    Respiratory: Negative for shortness of breath.    Cardiovascular: Negative for chest pain.   Gastrointestinal: Negative for nausea.   Genitourinary: Negative for dysuria.   Musculoskeletal: Negative for back pain.   Skin: Positive for rash.   Neurological: Negative for weakness.   Hematological: Does not bruise/bleed easily.       Physical Exam     Initial Vitals [07/01/20 2047]   BP Pulse Resp Temp SpO2   120/69 92 16 98.6 °F (37 °C) 100 %      MAP       --         Physical Exam    Constitutional: She appears well-developed and well-nourished. She is not diaphoretic. No distress.   HENT:   Head: Normocephalic and atraumatic.   Eyes: EOM are normal. Pupils are equal, round, and reactive to light.   Neck: Normal range of motion. Neck supple.   Cardiovascular: Normal rate, regular rhythm, normal heart sounds and intact distal pulses. Exam reveals no gallop and no friction rub.    No murmur heard.  Pulmonary/Chest: Breath sounds normal. No respiratory distress. She has no wheezes. She has no  rhonchi. She has no rales. She exhibits no tenderness.   Abdominal: Soft. Bowel sounds are normal. She exhibits no mass. There is no abdominal tenderness. There is no rebound and no guarding.   Gravid uterus, no tenderness.   Musculoskeletal: Normal range of motion. No tenderness or edema.   Neurological: She is alert and oriented to person, place, and time.   Skin: Skin is warm and dry. No erythema.   4 x 4 cm area of erythema on her proximal anterolateral lower leg with mild induration, warmth and tenderness.  No fluctuance.  Full range of motion of the knee without any effusion present.         ED Course   Procedures  Labs Reviewed - No data to display       Imaging Results    None          Medical Decision Making:   History:   Old Medical Records: I decided to obtain old medical records.  Initial Assessment:   Patient is a very pleasant 24-year-old woman who presents emergency department for evaluation rash on the left knee.  Physical examination is consistent with cellulitis.  I do not appreciate any underlying abscess.  She has had no systemic symptoms.  I do not think she warrants inpatient admission for IV antibiotics or needs drainage with incision.  Patient is given clindamycin and mupirocin and in the ED will be discharged home with the same.  Return precautions discussed.  OBGYN and PCP follow-up.  She is discharged improved in no acute distress.                                 Clinical Impression:       ICD-10-CM ICD-9-CM   1. Cellulitis of left lower extremity  L03.116 682.6                                Miguel Hernandez MD  07/01/20 2139

## 2020-07-08 ENCOUNTER — INFUSION (OUTPATIENT)
Dept: INFUSION THERAPY | Facility: HOSPITAL | Age: 25
End: 2020-07-08
Attending: OBSTETRICS & GYNECOLOGY
Payer: MEDICAID

## 2020-07-08 ENCOUNTER — HOSPITAL ENCOUNTER (OUTPATIENT)
Facility: HOSPITAL | Age: 25
Discharge: HOME | End: 2020-07-08
Attending: OBSTETRICS & GYNECOLOGY | Admitting: OBSTETRICS & GYNECOLOGY
Payer: MEDICAID

## 2020-07-08 VITALS
TEMPERATURE: 99 F | HEART RATE: 105 BPM | BODY MASS INDEX: 24.53 KG/M2 | RESPIRATION RATE: 14 BRPM | SYSTOLIC BLOOD PRESSURE: 115 MMHG | WEIGHT: 156.31 LBS | OXYGEN SATURATION: 97 % | DIASTOLIC BLOOD PRESSURE: 79 MMHG | HEIGHT: 67 IN

## 2020-07-08 VITALS — SYSTOLIC BLOOD PRESSURE: 132 MMHG | HEART RATE: 88 BPM | DIASTOLIC BLOOD PRESSURE: 70 MMHG

## 2020-07-08 DIAGNOSIS — O47.9 UTERINE CONTRACTIONS DURING PREGNANCY: Primary | ICD-10-CM

## 2020-07-08 DIAGNOSIS — O36.8190 DECREASED FETAL MOVEMENT: ICD-10-CM

## 2020-07-08 LAB — INJECT RH IG VOL PATIENT: NORMAL ML

## 2020-07-08 PROCEDURE — 96372 THER/PROPH/DIAG INJ SC/IM: CPT

## 2020-07-08 PROCEDURE — 59025 FETAL NON-STRESS TEST: CPT

## 2020-07-08 PROCEDURE — 63600519 RHOGAM PHARM REV CODE 636 ALT 250 W HCPCS: Performed by: OBSTETRICS & GYNECOLOGY

## 2020-07-08 RX ADMIN — HUMAN RHO(D) IMMUNE GLOBULIN 300 MCG: 1500 SOLUTION INTRAMUSCULAR; INTRAVENOUS at 01:07

## 2020-07-11 ENCOUNTER — HOSPITAL ENCOUNTER (OUTPATIENT)
Facility: HOSPITAL | Age: 25
Discharge: HOME | End: 2020-07-11
Attending: OBSTETRICS & GYNECOLOGY | Admitting: SPECIALIST
Payer: MEDICAID

## 2020-07-11 VITALS
RESPIRATION RATE: 18 BRPM | HEART RATE: 84 BPM | TEMPERATURE: 98 F | SYSTOLIC BLOOD PRESSURE: 122 MMHG | DIASTOLIC BLOOD PRESSURE: 64 MMHG

## 2020-07-11 DIAGNOSIS — R10.9 ABDOMINAL PAIN AFFECTING PREGNANCY, ANTEPARTUM: ICD-10-CM

## 2020-07-11 DIAGNOSIS — O26.899 ABDOMINAL PAIN AFFECTING PREGNANCY, ANTEPARTUM: ICD-10-CM

## 2020-07-11 LAB
BILIRUB UR QL STRIP: NEGATIVE
CLARITY UR: CLEAR
COLOR UR: YELLOW
GLUCOSE UR QL STRIP: NEGATIVE
HGB UR QL STRIP: NEGATIVE
KETONES UR QL STRIP: NEGATIVE
LEUKOCYTE ESTERASE UR QL STRIP: NEGATIVE
NITRITE UR QL STRIP: NEGATIVE
PH UR STRIP: 8 [PH] (ref 5–8)
PROT UR QL STRIP: NEGATIVE
SP GR UR STRIP: 1.01 (ref 1–1.03)
URN SPEC COLLECT METH UR: NORMAL
UROBILINOGEN UR STRIP-ACNC: NEGATIVE EU/DL

## 2020-07-11 PROCEDURE — 99211 OFF/OP EST MAY X REQ PHY/QHP: CPT

## 2020-07-11 PROCEDURE — 81003 URINALYSIS AUTO W/O SCOPE: CPT

## 2020-07-11 NOTE — DISCHARGE INSTRUCTIONS
Keep your scheduled appointment with your provider.    Call your Doctor if you have any of the following:  Temperature above 100 degrees  Nausea, vomiting and/or diarrhea  Severe headache, dizziness, or blurred vision  Notable increase in swelling of hands or feet  Notable swelling of face and lips  Difficulty, pain or burning with urination  Foul smelling vaginal discharge  Decreased fetal movement    Come to the hospital if you have any of the following symptoms:  Your water breaks  More than 4-6 contractions in 1 hour for 2 or more hours  Vaginal bleeding like a period    After 28 weeks, you should feel 10 distinct fetal movements within a 2 hour period.    It is recommended that you drink 1/2 a gallon of water each day.  Tea, Soda and Juice are  in addition to this.      False Labor  If your pregnancy is at 37 weeks or longer and you are having contractions that are not true labor, you are having false labor. This means that it is not time yet to give birth to your baby.  True labor contractions can start unevenly but soon take on a regular pattern. As time goes on, the contractions will get stronger. Also, the intervals between the contractions will get shorter. Even at the onset, these contractions last at least 30 seconds and may increase to a minute. True labor contractions often start in the back and then move to the front.  False labor contractions can be strong, frequent, and painful, but there is no regular pattern. The intensity can vary from strong to mild to strong again. False labor contractions are most often felt in the front. While true labor contractions dont stop no matter what you are doing, false labor contractions may stop on their own or when you rest or move around.  False labor contractions might make you feel anxious or lose sleep. However, they dont mean that you are sick or that anything is wrong with your baby. You dont need to take any medicine for false labor.  Sometimes, it may  be too hard to tell false labor from true labor. In such cases, you may need to have a vaginal exam. This allows your healthcare provider to check for changes in the cervix that only occur with true labor.  Home care  · It may help to drink plenty of water and take warm baths. Do what you can ahead of time to prepare for giving birth so youll have less to worry about later.  · Keep a record of your contractions. Write down what time each one starts and how long it lasts. A stopwatch is helpful. Look for the pattern of regularly spaced out contractions with a gradual increase in the time each one lasts.  · Dont be embarrassed about going to the hospital with a false alarm. Think of it as good practice for the real thing.    Follow-up care  Follow up with your healthcare provider, or as advised. If you are very worried, confused, cant eat or sleep, or have questions about your health or pregnancy, schedule an appointment with your provider.  When to seek medical advice  Call your healthcare provider right away if any of these occur:  · You are fewer than 37 weeks along in your pregnancy and youre having contractions.  · You have contractions that are regular, getting longer, stronger, and closer together.  · Your water breaks.  · You have vaginal bleeding.  · You feel a decrease in your babys movement or any other unusual changes.   · Youre not sure if you are having false or true labor.  Date Last Reviewed: 8/20/2015  © 0989-8139 [employer]. [address] [place] [address], [place] [address]. All rights reserved. This information is not intended as a substitute for professional medical care. Always follow your healthcare professional's instructions.        Kick Counts    Its normal to worry about your babys health. One way you can know your babys doing well is to record the babys movements once a day. This is called a kick count. Remember to take your kick count records to all your appointments with  your healthcare provider.  How to count kicks  Here are tips for counting kicks:  · Choose a time when the baby is active, such as after a meal.   · Sit comfortably or lie on your side.   · The first time the baby moves, write down the time.   · Count each movement until the baby has moved 10 times. This can take from 20 minutes to 2 hours.   · Try to do it at the same time each day.  When to call your healthcare provider  Call your healthcare provider right away if you notice any of the following:  · Your baby moves fewer than 10 times in 2 hours while youre doing kick counts.  · Your baby moves much less often than on the days before.  · You have not felt your baby move all day.  Date Last Reviewed: 8/5/2015  © 6968-3075 [employer]. [address] [place] [address], [place] [address]. All rights reserved. This information is not intended as a substitute for professional medical care. Always follow your healthcare professional's instructions.

## 2020-07-22 ENCOUNTER — HOSPITAL ENCOUNTER (OUTPATIENT)
Facility: HOSPITAL | Age: 25
Discharge: HOME | End: 2020-07-22
Attending: OBSTETRICS & GYNECOLOGY | Admitting: SPECIALIST
Payer: MEDICAID

## 2020-07-22 DIAGNOSIS — O36.8190 DECREASED FETAL MOVEMENT: ICD-10-CM

## 2020-07-22 PROCEDURE — 59025 FETAL NON-STRESS TEST: CPT

## 2020-07-26 ENCOUNTER — HOSPITAL ENCOUNTER (EMERGENCY)
Facility: HOSPITAL | Age: 25
Discharge: HOME | End: 2020-07-26
Attending: EMERGENCY MEDICINE
Payer: MEDICAID

## 2020-07-26 VITALS
HEART RATE: 96 BPM | WEIGHT: 156 LBS | TEMPERATURE: 99 F | HEIGHT: 66 IN | SYSTOLIC BLOOD PRESSURE: 121 MMHG | DIASTOLIC BLOOD PRESSURE: 75 MMHG | RESPIRATION RATE: 20 BRPM | BODY MASS INDEX: 25.07 KG/M2 | OXYGEN SATURATION: 100 %

## 2020-07-26 DIAGNOSIS — Z34.90 TERM PREGNANCY: ICD-10-CM

## 2020-07-26 DIAGNOSIS — U07.1 COVID-19 VIRUS DETECTED: ICD-10-CM

## 2020-07-26 DIAGNOSIS — U07.1 COVID-19 VIRUS DETECTED: Primary | ICD-10-CM

## 2020-07-26 LAB
ALBUMIN SERPL BCP-MCNC: 2.8 G/DL (ref 3.5–5.2)
ALP SERPL-CCNC: 114 U/L (ref 55–135)
ALT SERPL W/O P-5'-P-CCNC: 11 U/L (ref 10–44)
ANION GAP SERPL CALC-SCNC: 10 MMOL/L (ref 8–16)
AST SERPL-CCNC: 18 U/L (ref 10–40)
BASOPHILS # BLD AUTO: 0.04 K/UL (ref 0–0.2)
BASOPHILS NFR BLD: 0.4 % (ref 0–1.9)
BILIRUB SERPL-MCNC: 0.6 MG/DL (ref 0.1–1)
BUN SERPL-MCNC: 7 MG/DL (ref 6–20)
CALCIUM SERPL-MCNC: 8.4 MG/DL (ref 8.7–10.5)
CHLORIDE SERPL-SCNC: 102 MMOL/L (ref 95–110)
CO2 SERPL-SCNC: 23 MMOL/L (ref 23–29)
CREAT SERPL-MCNC: 0.6 MG/DL (ref 0.5–1.4)
DIFFERENTIAL METHOD: ABNORMAL
EOSINOPHIL # BLD AUTO: 0 K/UL (ref 0–0.5)
EOSINOPHIL NFR BLD: 0.2 % (ref 0–8)
ERYTHROCYTE [DISTWIDTH] IN BLOOD BY AUTOMATED COUNT: 14.8 % (ref 11.5–14.5)
EST. GFR  (AFRICAN AMERICAN): >60 ML/MIN/1.73 M^2
EST. GFR  (NON AFRICAN AMERICAN): >60 ML/MIN/1.73 M^2
FERRITIN SERPL-MCNC: 8 NG/ML (ref 20–300)
GLUCOSE SERPL-MCNC: 96 MG/DL (ref 70–110)
HCT VFR BLD AUTO: 34.7 % (ref 37–48.5)
HGB BLD-MCNC: 10.7 G/DL (ref 12–16)
IMM GRANULOCYTES # BLD AUTO: 0.09 K/UL (ref 0–0.04)
IMM GRANULOCYTES NFR BLD AUTO: 0.9 % (ref 0–0.5)
LYMPHOCYTES # BLD AUTO: 0.9 K/UL (ref 1–4.8)
LYMPHOCYTES NFR BLD: 9.1 % (ref 18–48)
MCH RBC QN AUTO: 27 PG (ref 27–31)
MCHC RBC AUTO-ENTMCNC: 30.8 G/DL (ref 32–36)
MCV RBC AUTO: 88 FL (ref 82–98)
MONOCYTES # BLD AUTO: 0.8 K/UL (ref 0.3–1)
MONOCYTES NFR BLD: 7.8 % (ref 4–15)
NEUTROPHILS # BLD AUTO: 8 K/UL (ref 1.8–7.7)
NEUTROPHILS NFR BLD: 81.6 % (ref 38–73)
NRBC BLD-RTO: 0 /100 WBC
PLATELET # BLD AUTO: 223 K/UL (ref 150–350)
PMV BLD AUTO: 9.9 FL (ref 9.2–12.9)
POTASSIUM SERPL-SCNC: 4.1 MMOL/L (ref 3.5–5.1)
PROCALCITONIN SERPL IA-MCNC: <0.05 NG/ML (ref 0–0.5)
PROT SERPL-MCNC: 6.8 G/DL (ref 6–8.4)
RBC # BLD AUTO: 3.96 M/UL (ref 4–5.4)
SARS-COV-2 RDRP RESP QL NAA+PROBE: POSITIVE
SODIUM SERPL-SCNC: 135 MMOL/L (ref 136–145)
WBC # BLD AUTO: 9.8 K/UL (ref 3.9–12.7)

## 2020-07-26 PROCEDURE — U0002 COVID-19 LAB TEST NON-CDC: HCPCS

## 2020-07-26 PROCEDURE — 82728 ASSAY OF FERRITIN: CPT

## 2020-07-26 PROCEDURE — 80053 COMPREHEN METABOLIC PANEL: CPT

## 2020-07-26 PROCEDURE — 99283 EMERGENCY DEPT VISIT LOW MDM: CPT

## 2020-07-26 PROCEDURE — U0003 INFECTIOUS AGENT DETECTION BY NUCLEIC ACID (DNA OR RNA); SEVERE ACUTE RESPIRATORY SYNDROME CORONAVIRUS 2 (SARS-COV-2) (CORONAVIRUS DISEASE [COVID-19]), AMPLIFIED PROBE TECHNIQUE, MAKING USE OF HIGH THROUGHPUT TECHNOLOGIES AS DESCRIBED BY CMS-2020-01-R: HCPCS

## 2020-07-26 PROCEDURE — 36415 COLL VENOUS BLD VENIPUNCTURE: CPT

## 2020-07-26 PROCEDURE — 84145 PROCALCITONIN (PCT): CPT

## 2020-07-26 PROCEDURE — 85025 COMPLETE CBC W/AUTO DIFF WBC: CPT

## 2020-07-26 NOTE — DISCHARGE INSTRUCTIONS
Quarantine.  Notify Dr. Killian tomorrow morning of your COVID positive status.  Watch for any fever, chest pain, shortness of breath, nausea, vomiting.  If any problem such as this develops, return to the hospital

## 2020-07-26 NOTE — ED PROVIDER NOTES
Encounter Date: 7/26/2020       History     Chief Complaint   Patient presents with    COVID-19 Concerns     APPROX 39 WEEKS PREG    LOSS OF TASTE     24-year-old female who is 39 weeks gestation, presents emergency room with concerns that she is COVID positive.  Patient states for the last few days she has had a loss of taste and smell.  She has had no known contacts with COVID positive individuals however she did have eat at restaurants and has been shopping centers.  She denies any coughing and has had some equivocal shortness of breath which she attributes that to being near term pregnancy.  No complaint of any fever.  No wheezing.  She is not a smoker.  This is her 2nd pregnancy has 1 child.  No associated nausea vomiting or diarrhea.  She does admit to some rhinitis and slight sore throat.  Bowel movements have been normal.  No fever.        Review of patient's allergies indicates:  No Known Allergies  Past Medical History:   Diagnosis Date    Hard of hearing     Herpes genitalis in women      No past surgical history on file.  No family history on file.  Social History     Tobacco Use    Smoking status: Never Smoker    Smokeless tobacco: Never Used   Substance Use Topics    Alcohol use: No    Drug use: No     Review of Systems   Constitutional: Negative for chills, diaphoresis, fatigue and fever.   HENT: Positive for rhinorrhea. Negative for sinus pressure, sinus pain, sore throat and trouble swallowing.    Eyes: Negative for visual disturbance.   Respiratory: Positive for shortness of breath. Negative for cough and wheezing.    Cardiovascular: Negative for chest pain, palpitations and leg swelling.   Gastrointestinal: Positive for abdominal pain. Negative for nausea and vomiting.        Occasional cramping associated with pregnancy   Genitourinary: Negative for difficulty urinating.   Musculoskeletal: Negative for arthralgias and myalgias.   Skin: Negative for pallor and rash.   Neurological:  Negative for dizziness, seizures, syncope and headaches.   All other systems reviewed and are negative.      Physical Exam     Initial Vitals [07/26/20 1415]   BP Pulse Resp Temp SpO2   124/80 (!) 113 20 96.7 °F (35.9 °C) 98 %      MAP       --         Physical Exam    Vitals reviewed.  Constitutional: She appears well-developed and well-nourished. She is not diaphoretic. No distress.   HENT:   Head: Normocephalic and atraumatic.   Right Ear: External ear normal.   Left Ear: External ear normal.   Nose: Nose normal.   Mouth/Throat: Oropharynx is clear and moist. No oropharyngeal exudate.   Eyes: Conjunctivae are normal. Pupils are equal, round, and reactive to light. Right eye exhibits no discharge. Left eye exhibits no discharge.   Neck: Normal range of motion. Neck supple. No JVD present.   Cardiovascular: Normal rate, regular rhythm, normal heart sounds and intact distal pulses. Exam reveals no gallop and no friction rub.    No murmur heard.  Pulmonary/Chest: Breath sounds normal. No respiratory distress. She has no wheezes. She has no rhonchi. She has no rales.   Abdominal: Soft. Bowel sounds are normal. She exhibits no distension and no mass. There is no abdominal tenderness. There is no rebound and no guarding.   Gravid.  Positive placental soufle.  Positive fetal movement   Musculoskeletal: Normal range of motion. No tenderness or edema.   Lymphadenopathy:     She has no cervical adenopathy.   Neurological: She is alert and oriented to person, place, and time. She has normal strength. GCS score is 15. GCS eye subscore is 4. GCS verbal subscore is 5. GCS motor subscore is 6.   Skin: Skin is warm and dry. Capillary refill takes less than 2 seconds. No rash noted. No erythema. No pallor.   Psychiatric: She has a normal mood and affect. Her behavior is normal. Judgment and thought content normal.         ED Course   Procedures  Labs Reviewed   SARS-COV-2 (COVID-19) QUALITATIVE PCR   CBC W/ AUTO DIFFERENTIAL    COMPREHENSIVE METABOLIC PANEL   PROCALCITONIN   FERRITIN          Imaging Results    None                       Attending Attestation:             Attending ED Notes:   This 24 female who is  2 para 1 who is approximately 39 weeks gestation who presented concerned that she may have COVID 19 due to her loss of taste and smell, is COVID positive.  The patient's oxygenation is still excellent and she has clear lung fields with no respiratory distress.  Labs were reviewed and LFTs were normal.  Ferritin is low.  She is advised to notify Dr. Killian tomorrow morning of her COVID status and counseled that if she has chest pain increased trouble breathing, high fever, nausea, vomiting, that she is to return to the hospital.          ED Course as of  1543   Sun 2020   1415 Cough and loss of taste pt is 39 wks preg    [KN]      ED Course User Index  [KN] Elena Yousif NP                Clinical Impression:       ICD-10-CM ICD-9-CM   1. COVID-19 virus detected  U07.1    2. Term pregnancy  Z34.90 V22.1                                Geovany Gann Jr., MD  20 8071

## 2020-07-27 PROBLEM — R10.9 ABDOMINAL PAIN AFFECTING PREGNANCY: Status: ACTIVE | Noted: 2020-07-27

## 2020-07-27 PROBLEM — O26.899 ABDOMINAL PAIN AFFECTING PREGNANCY: Status: ACTIVE | Noted: 2020-07-27

## 2020-07-28 ENCOUNTER — HOSPITAL ENCOUNTER (INPATIENT)
Facility: HOSPITAL | Age: 25
LOS: 2 days | Discharge: HOME | End: 2020-07-30
Attending: STUDENT IN AN ORGANIZED HEALTH CARE EDUCATION/TRAINING PROGRAM | Admitting: STUDENT IN AN ORGANIZED HEALTH CARE EDUCATION/TRAINING PROGRAM
Payer: MEDICAID

## 2020-07-28 ENCOUNTER — TELEPHONE (OUTPATIENT)
Dept: EMERGENCY MEDICINE | Facility: HOSPITAL | Age: 25
End: 2020-07-28

## 2020-07-28 DIAGNOSIS — U07.1 COVID-19 VIRUS INFECTION: ICD-10-CM

## 2020-07-28 DIAGNOSIS — O26.899 ABDOMINAL PAIN AFFECTING PREGNANCY: ICD-10-CM

## 2020-07-28 DIAGNOSIS — R10.9 ABDOMINAL PAIN AFFECTING PREGNANCY: ICD-10-CM

## 2020-07-28 LAB
ABO + RH BLD: NORMAL
ALBUMIN SERPL BCP-MCNC: 3.1 G/DL (ref 3.5–5.2)
ALP SERPL-CCNC: 151 U/L (ref 55–135)
ALT SERPL W/O P-5'-P-CCNC: 15 U/L (ref 10–44)
AMPHET+METHAMPHET UR QL: NEGATIVE
ANION GAP SERPL CALC-SCNC: 12 MMOL/L (ref 8–16)
AST SERPL-CCNC: 23 U/L (ref 10–40)
BACTERIA #/AREA URNS HPF: ABNORMAL /HPF
BARBITURATES UR QL SCN>200 NG/ML: NEGATIVE
BASOPHILS # BLD AUTO: 0.03 K/UL (ref 0–0.2)
BASOPHILS NFR BLD: 0.3 % (ref 0–1.9)
BENZODIAZ UR QL SCN>200 NG/ML: NEGATIVE
BILIRUB SERPL-MCNC: 0.8 MG/DL (ref 0.1–1)
BILIRUB UR QL STRIP: NEGATIVE
BLD GP AB SCN CELLS X3 SERPL QL: NORMAL
BLOOD GROUP ANTIBODIES SERPL: NORMAL
BUN SERPL-MCNC: 8 MG/DL (ref 6–20)
BZE UR QL SCN: NEGATIVE
CALCIUM SERPL-MCNC: 9.2 MG/DL (ref 8.7–10.5)
CANNABINOIDS UR QL SCN: NEGATIVE
CAOX CRY URNS QL MICRO: ABNORMAL
CHLORIDE SERPL-SCNC: 101 MMOL/L (ref 95–110)
CLARITY UR: ABNORMAL
CO2 SERPL-SCNC: 22 MMOL/L (ref 23–29)
COLOR UR: YELLOW
CREAT SERPL-MCNC: 0.6 MG/DL (ref 0.5–1.4)
CREAT UR-MCNC: 244 MG/DL (ref 15–325)
DIFFERENTIAL METHOD: ABNORMAL
EOSINOPHIL # BLD AUTO: 0 K/UL (ref 0–0.5)
EOSINOPHIL NFR BLD: 0 % (ref 0–8)
ERYTHROCYTE [DISTWIDTH] IN BLOOD BY AUTOMATED COUNT: 14.6 % (ref 11.5–14.5)
EST. GFR  (AFRICAN AMERICAN): >60 ML/MIN/1.73 M^2
EST. GFR  (NON AFRICAN AMERICAN): >60 ML/MIN/1.73 M^2
GLUCOSE SERPL-MCNC: 76 MG/DL (ref 70–110)
GLUCOSE UR QL STRIP: NEGATIVE
HCT VFR BLD AUTO: 36.7 % (ref 37–48.5)
HGB BLD-MCNC: 11.7 G/DL (ref 12–16)
HGB UR QL STRIP: ABNORMAL
HYALINE CASTS #/AREA URNS LPF: 17 /LPF
IMM GRANULOCYTES # BLD AUTO: 0.07 K/UL (ref 0–0.04)
IMM GRANULOCYTES NFR BLD AUTO: 0.7 % (ref 0–0.5)
KETONES UR QL STRIP: NEGATIVE
LEUKOCYTE ESTERASE UR QL STRIP: ABNORMAL
LYMPHOCYTES # BLD AUTO: 1.3 K/UL (ref 1–4.8)
LYMPHOCYTES NFR BLD: 12.3 % (ref 18–48)
MCH RBC QN AUTO: 27.2 PG (ref 27–31)
MCHC RBC AUTO-ENTMCNC: 31.9 G/DL (ref 32–36)
MCV RBC AUTO: 85 FL (ref 82–98)
MICROSCOPIC COMMENT: ABNORMAL
MONOCYTES # BLD AUTO: 0.7 K/UL (ref 0.3–1)
MONOCYTES NFR BLD: 7.1 % (ref 4–15)
NEUTROPHILS # BLD AUTO: 8.1 K/UL (ref 1.8–7.7)
NEUTROPHILS NFR BLD: 79.6 % (ref 38–73)
NITRITE UR QL STRIP: NEGATIVE
NRBC BLD-RTO: 0 /100 WBC
OPIATES UR QL SCN: NEGATIVE
PCP UR QL SCN>25 NG/ML: NEGATIVE
PCP UR QL SCN>25 NG/ML: NEGATIVE
PH UR STRIP: 6 [PH] (ref 5–8)
PLATELET # BLD AUTO: 269 K/UL (ref 150–350)
PMV BLD AUTO: 9.9 FL (ref 9.2–12.9)
POTASSIUM SERPL-SCNC: 3.9 MMOL/L (ref 3.5–5.1)
PROT SERPL-MCNC: 7.1 G/DL (ref 6–8.4)
PROT UR QL STRIP: ABNORMAL
RBC # BLD AUTO: 4.3 M/UL (ref 4–5.4)
RBC #/AREA URNS HPF: 1 /HPF (ref 0–4)
SARS-COV-2 RNA RESP QL NAA+PROBE: DETECTED
SODIUM SERPL-SCNC: 135 MMOL/L (ref 136–145)
SP GR UR STRIP: 1.02 (ref 1–1.03)
SQUAMOUS #/AREA URNS HPF: 4 /HPF
TOXICOLOGY INFORMATION: NORMAL
URN SPEC COLLECT METH UR: ABNORMAL
UROBILINOGEN UR STRIP-ACNC: ABNORMAL EU/DL
WBC # BLD AUTO: 10.17 K/UL (ref 3.9–12.7)
WBC #/AREA URNS HPF: 26 /HPF (ref 0–5)

## 2020-07-28 PROCEDURE — 86901 BLOOD TYPING SEROLOGIC RH(D): CPT

## 2020-07-28 PROCEDURE — 80053 COMPREHEN METABOLIC PANEL: CPT

## 2020-07-28 PROCEDURE — 86870 RBC ANTIBODY IDENTIFICATION: CPT

## 2020-07-28 PROCEDURE — 85025 COMPLETE CBC W/AUTO DIFF WBC: CPT

## 2020-07-28 PROCEDURE — 63600175 PHARM REV CODE 636 W HCPCS: Performed by: STUDENT IN AN ORGANIZED HEALTH CARE EDUCATION/TRAINING PROGRAM

## 2020-07-28 PROCEDURE — 25000003 PHARM REV CODE 250: Performed by: SPECIALIST

## 2020-07-28 PROCEDURE — 86592 SYPHILIS TEST NON-TREP QUAL: CPT

## 2020-07-28 PROCEDURE — 81001 URINALYSIS AUTO W/SCOPE: CPT

## 2020-07-28 PROCEDURE — 80307 DRUG TEST PRSMV CHEM ANLYZR: CPT

## 2020-07-28 PROCEDURE — 87086 URINE CULTURE/COLONY COUNT: CPT

## 2020-07-28 PROCEDURE — 87491 CHLMYD TRACH DNA AMP PROBE: CPT

## 2020-07-28 PROCEDURE — 12000002 HC ACUTE/MED SURGE SEMI-PRIVATE ROOM

## 2020-07-28 PROCEDURE — 25000003 PHARM REV CODE 250: Performed by: STUDENT IN AN ORGANIZED HEALTH CARE EDUCATION/TRAINING PROGRAM

## 2020-07-28 PROCEDURE — 72200004 HC VAGINAL DELIVERY LEVEL I

## 2020-07-28 RX ORDER — METHYLERGONOVINE MALEATE 0.2 MG/ML
200 INJECTION INTRAVENOUS ONCE
Status: COMPLETED | OUTPATIENT
Start: 2020-07-28 | End: 2020-07-28

## 2020-07-28 RX ORDER — DOCUSATE SODIUM 100 MG/1
200 CAPSULE, LIQUID FILLED ORAL 2 TIMES DAILY PRN
Status: DISCONTINUED | OUTPATIENT
Start: 2020-07-28 | End: 2020-07-31 | Stop reason: HOSPADM

## 2020-07-28 RX ORDER — AMOXICILLIN 250 MG
1 CAPSULE ORAL NIGHTLY
Status: DISCONTINUED | OUTPATIENT
Start: 2020-07-28 | End: 2020-07-31 | Stop reason: HOSPADM

## 2020-07-28 RX ORDER — DIPHENHYDRAMINE HCL 25 MG
25 CAPSULE ORAL EVERY 4 HOURS PRN
Status: DISCONTINUED | OUTPATIENT
Start: 2020-07-28 | End: 2020-07-31 | Stop reason: HOSPADM

## 2020-07-28 RX ORDER — OXYCODONE HYDROCHLORIDE 5 MG/1
5 TABLET ORAL EVERY 4 HOURS PRN
Status: DISCONTINUED | OUTPATIENT
Start: 2020-07-28 | End: 2020-07-31 | Stop reason: HOSPADM

## 2020-07-28 RX ORDER — OXYCODONE HYDROCHLORIDE 5 MG/1
10 TABLET ORAL EVERY 4 HOURS PRN
Status: DISCONTINUED | OUTPATIENT
Start: 2020-07-28 | End: 2020-07-31 | Stop reason: HOSPADM

## 2020-07-28 RX ORDER — BUTORPHANOL TARTRATE 2 MG/ML
1 INJECTION INTRAMUSCULAR; INTRAVENOUS
Status: DISCONTINUED | OUTPATIENT
Start: 2020-07-28 | End: 2020-07-28

## 2020-07-28 RX ORDER — ONDANSETRON 4 MG/1
8 TABLET, ORALLY DISINTEGRATING ORAL EVERY 8 HOURS PRN
Status: DISCONTINUED | OUTPATIENT
Start: 2020-07-28 | End: 2020-07-31 | Stop reason: HOSPADM

## 2020-07-28 RX ORDER — PRENATAL WITH FERROUS FUM AND FOLIC ACID 3080; 920; 120; 400; 22; 1.84; 3; 20; 10; 1; 12; 200; 27; 25; 2 [IU]/1; [IU]/1; MG/1; [IU]/1; MG/1; MG/1; MG/1; MG/1; MG/1; MG/1; UG/1; MG/1; MG/1; MG/1; MG/1
1 TABLET ORAL DAILY
Status: DISCONTINUED | OUTPATIENT
Start: 2020-07-29 | End: 2020-07-31 | Stop reason: HOSPADM

## 2020-07-28 RX ORDER — ACETAMINOPHEN 325 MG/1
975 TABLET ORAL EVERY 8 HOURS
Status: DISPENSED | OUTPATIENT
Start: 2020-07-28 | End: 2020-07-29

## 2020-07-28 RX ORDER — HYDROCORTISONE 25 MG/G
CREAM TOPICAL 3 TIMES DAILY PRN
Status: DISCONTINUED | OUTPATIENT
Start: 2020-07-28 | End: 2020-07-31 | Stop reason: HOSPADM

## 2020-07-28 RX ORDER — BUTORPHANOL TARTRATE 2 MG/ML
2 INJECTION INTRAMUSCULAR; INTRAVENOUS ONCE
Status: COMPLETED | OUTPATIENT
Start: 2020-07-28 | End: 2020-07-28

## 2020-07-28 RX ORDER — SODIUM CHLORIDE 9 MG/ML
INJECTION, SOLUTION INTRAVENOUS
Status: DISCONTINUED | OUTPATIENT
Start: 2020-07-28 | End: 2020-07-28

## 2020-07-28 RX ORDER — SODIUM CHLORIDE 9 MG/ML
INJECTION, SOLUTION INTRAVENOUS CONTINUOUS
Status: DISCONTINUED | OUTPATIENT
Start: 2020-07-28 | End: 2020-07-28

## 2020-07-28 RX ADMIN — METHYLERGONOVINE MALEATE 200 MCG: 0.2 INJECTION, SOLUTION INTRAMUSCULAR; INTRAVENOUS at 04:07

## 2020-07-28 RX ADMIN — SENNOSIDES AND DOCUSATE SODIUM 1 TABLET: 8.6; 5 TABLET ORAL at 10:07

## 2020-07-28 RX ADMIN — ACETAMINOPHEN 975 MG: 325 TABLET, FILM COATED ORAL at 10:07

## 2020-07-28 RX ADMIN — BUTORPHANOL TARTRATE 2 MG: 2 INJECTION, SOLUTION INTRAMUSCULAR; INTRAVENOUS at 04:07

## 2020-07-28 RX ADMIN — PENICILLIN G POTASSIUM 5 MILLION UNITS: 5000000 INJECTION, POWDER, FOR SOLUTION INTRAMUSCULAR; INTRAVENOUS at 04:07

## 2020-07-28 NOTE — TELEPHONE ENCOUNTER
Your test was POSITIVE for COVID-19 (coronavirus).     Please follow discharge instructions given with your ED visit. If symptoms worsen please contact your PCP or go to the nearest Emergency Room.       Sincerely,     Reshma Lao RN

## 2020-07-28 NOTE — L&D DELIVERY NOTE
[facility]  Vaginal Delivery   Operative Note    SUMMARY   Patient presented to L&D in active labor and quickly progressed to complete. Pt began to push and underwent   Normal spontaneous vaginal delivery of live infant, skin to skin was unable to be performed due to requiring stimulation by NNP.  Infant delivered position JAD over intact perineum.  Nuchal cord: Yes, cord reduced following delivery.    Spontaneous delivery of placenta and IV pitocin given noting good uterine tone with bleeding. Digital exam showed retained placenta, removed manually after receiving IV Stadol.  Left labial and posterior perineal laceration noted and hemostatic, not requiring repair.  Patient tolerated delivery well. Sponge needle and lap counted correctly x2.    Awaiting updated delivery information    Indications: labor  Pregnancy complicated by:   Patient Active Problem List   Diagnosis    Uterine contractions during pregnancy    Decreased fetal movement    Abdominal pain affecting pregnancy, antepartum    Abdominal pain affecting pregnancy     Admitting GA: 39w6d    Delivery Information for Girl Kayla Schoenberger    Birth information:  YOB: 2022   Time of birth: 4:39 PM   Sex: female   Head Delivery Date/Time:     Delivery type:    Gestational Age: 39w6d    Delivery Providers    Delivering clinician:            Measurements    Weight:   Length:          Apgars    Living status:   Apgars:  1 min.:  5 min.:  10 min.:  15 min.:  20 min.:    Skin color:         Heart rate:         Reflex irritability:         Muscle tone:         Respiratory effort:         Total:                                Interventions/Resuscitation         Cord    No data filed        Placenta    Placenta delivery date/time:   Placenta removal:            Labor Events:       labor:       Labor Onset Date/Time:         Dilation Complete Date/Time: 2020 16:32     Start Pushing Date/Time:         Start Pushing  Date/Time:       Rupture Date/Time:            Rupture type:          Fluid Amount:       Fluid Color:       Fluid Odor:       Membrane Status:                steroids:       Antibiotics given for GBS:       Induction:       Indications for induction:        Augmentation:       Indications for augmentation:       Labor complications:       Additional complications:          Cervical ripening:                     Delivery:      Episiotomy:       Indication for Episiotomy:       Perineal Lacerations:   Repaired:      Periurethral Laceration:   Repaired:     Labial Laceration:   Repaired:     Sulcus Laceration:   Repaired:     Vaginal Laceration:   Repaired:     Cervical Laceration:   Repaired:     Repair suture:       Repair # of packets:       Last Value - EBL - Nursing (mL):       Sum - EBL - Nursing (mL): 0     Last Value - EBL - Anesthesia (mL):      Calculated QBL (mL):       Vaginal Sweep Performed:       Surgicount Correct:         Other providers:            Details (if applicable):  Trial of Labor      Categorization:      Priority:     Indications for :     Incision Type:       Additional  information:  Forceps:    Vacuum:    Breech:    Observed anomalies    Other (Comments):         Kelly Jordan MD  Obstetrics and Gynecology  [facility]

## 2020-07-29 LAB
ABO + RH BLD: NORMAL
BASOPHILS # BLD AUTO: 0.04 K/UL (ref 0–0.2)
BASOPHILS NFR BLD: 0.4 % (ref 0–1.9)
BLD GP AB SCN CELLS X3 SERPL QL: NORMAL
DIFFERENTIAL METHOD: ABNORMAL
EOSINOPHIL # BLD AUTO: 0 K/UL (ref 0–0.5)
EOSINOPHIL NFR BLD: 0.2 % (ref 0–8)
ERYTHROCYTE [DISTWIDTH] IN BLOOD BY AUTOMATED COUNT: 14.7 % (ref 11.5–14.5)
FETAL CELL SCN BLD QL ROSETTE: NORMAL
HCT VFR BLD AUTO: 28.1 % (ref 37–48.5)
HGB BLD-MCNC: 8.9 G/DL (ref 12–16)
IMM GRANULOCYTES # BLD AUTO: 0.05 K/UL (ref 0–0.04)
IMM GRANULOCYTES NFR BLD AUTO: 0.5 % (ref 0–0.5)
INJECT RH IG VOL PATIENT: NORMAL ML
LYMPHOCYTES # BLD AUTO: 1.5 K/UL (ref 1–4.8)
LYMPHOCYTES NFR BLD: 14.4 % (ref 18–48)
MCH RBC QN AUTO: 27.5 PG (ref 27–31)
MCHC RBC AUTO-ENTMCNC: 31.7 G/DL (ref 32–36)
MCV RBC AUTO: 87 FL (ref 82–98)
MONOCYTES # BLD AUTO: 0.8 K/UL (ref 0.3–1)
MONOCYTES NFR BLD: 7.4 % (ref 4–15)
NEUTROPHILS # BLD AUTO: 7.9 K/UL (ref 1.8–7.7)
NEUTROPHILS NFR BLD: 77.1 % (ref 38–73)
NRBC BLD-RTO: 0 /100 WBC
PLATELET # BLD AUTO: 208 K/UL (ref 150–350)
PMV BLD AUTO: 9.6 FL (ref 9.2–12.9)
RBC # BLD AUTO: 3.24 M/UL (ref 4–5.4)
RPR SER QL: NORMAL
WBC # BLD AUTO: 10.22 K/UL (ref 3.9–12.7)

## 2020-07-29 PROCEDURE — 12000002 HC ACUTE/MED SURGE SEMI-PRIVATE ROOM

## 2020-07-29 PROCEDURE — 86901 BLOOD TYPING SEROLOGIC RH(D): CPT

## 2020-07-29 PROCEDURE — 85025 COMPLETE CBC W/AUTO DIFF WBC: CPT

## 2020-07-29 PROCEDURE — 36415 COLL VENOUS BLD VENIPUNCTURE: CPT

## 2020-07-29 PROCEDURE — 85461 HEMOGLOBIN FETAL: CPT

## 2020-07-29 PROCEDURE — 63600519 RHOGAM PHARM REV CODE 636 ALT 250 W HCPCS: Performed by: STUDENT IN AN ORGANIZED HEALTH CARE EDUCATION/TRAINING PROGRAM

## 2020-07-29 PROCEDURE — 25000003 PHARM REV CODE 250: Performed by: STUDENT IN AN ORGANIZED HEALTH CARE EDUCATION/TRAINING PROGRAM

## 2020-07-29 RX ADMIN — Medication: at 01:07

## 2020-07-29 RX ADMIN — PRENATAL VIT W/ FE FUMARATE-FA TAB 27-0.8 MG 1 TABLET: 27-0.8 TAB at 08:07

## 2020-07-29 RX ADMIN — OXYCODONE HYDROCHLORIDE 10 MG: 5 TABLET ORAL at 05:07

## 2020-07-29 RX ADMIN — SENNOSIDES AND DOCUSATE SODIUM 1 TABLET: 8.6; 5 TABLET ORAL at 09:07

## 2020-07-29 RX ADMIN — ACETAMINOPHEN 975 MG: 325 TABLET, FILM COATED ORAL at 05:07

## 2020-07-29 RX ADMIN — DOCUSATE SODIUM 200 MG: 100 CAPSULE, LIQUID FILLED ORAL at 08:07

## 2020-07-29 RX ADMIN — HUMAN RHO(D) IMMUNE GLOBULIN 300 MCG: 1500 SOLUTION INTRAMUSCULAR; INTRAVENOUS at 04:07

## 2020-07-29 RX ADMIN — BENZOCAINE AND LEVOMENTHOL: 200; 5 SPRAY TOPICAL at 08:07

## 2020-07-29 NOTE — PLAN OF CARE
07/29/20 1429   Discharge Assessment   Assessment Type Discharge Planning Assessment   Confirmed/corrected address and phone number on facesheet? Yes   Assessment information obtained from? Patient   Prior to hospitilization cognitive status: Alert/Oriented   Prior to hospitalization functional status: Independent   Current cognitive status: Alert/Oriented   Current Functional Status: Independent   Lives With alone   Able to Return to Prior Arrangements yes   Is patient able to care for self after discharge? Yes   Patient currently being followed by outpatient case management? No   Patient currently receives any other outside agency services? No   Equipment Currently Used at Home none   Do you have any problems affording any of your prescribed medications? No   Is the patient taking medications as prescribed? yes   Does the patient have transportation home? Yes   Transportation Anticipated family or friend will provide   Does the patient receive services at the [facility]madin Clinic? No   Discharge Plan A Home   Discharge Plan B Home   DME Needed Upon Discharge  none   Patient/Family in Agreement with Plan yes

## 2020-07-29 NOTE — PROGRESS NOTES
Vaginal Delivery Postpartum Day 1     Kayla Marie Schoenberger is doing well without complaints. Pain well controlled. Tolerating regular diet. Ambulating and voiding without difficulty. She denies any problems with pp blues. States bleeding is not heavy.     OBJECTIVE:     Vitals:    07/29/20 0330 07/29/20 0531 07/29/20 0800 07/29/20 1220   BP: 123/72  128/76 126/77   Pulse: 91  86 79   Resp: 17 16 17 18   Temp: 98.5 °F (36.9 °C)  97.4 °F (36.3 °C) 98.2 °F (36.8 °C)   TempSrc: Oral  Oral Axillary   SpO2:   100% 98%   Weight:       Height:            I & O (Last 24H):  Intake/Output Summary (Last 24 hours)    Intake/Output Summary (Last 24 hours) at 7/29/2020 1355  Last data filed at 7/28/2020 2010  Gross per 24 hour   Intake --   Output 950 ml   Net -950 ml         Physical Exam:  General: NAD, AAO   CV: Regular rate   Resp:   Nonlabored respirations   Abdomen: Soft, appropriately tender    Fundus: Firm   Lochia:  Appropriate   DVT Evaluation: No evidence of DVT on either side seen on physical exam.  No calf tenderness     Labs:  CBC:   Lab Results   Component Value Date/Time    WBC 10.22 07/29/2020 07:56 AM    RBC 3.24 (L) 07/29/2020 07:56 AM    HGB 8.9 (L) 07/29/2020 07:56 AM    HCT 28.1 (L) 07/29/2020 07:56 AM     07/29/2020 07:56 AM    MCV 87 07/29/2020 07:56 AM    MCH 27.5 07/29/2020 07:56 AM    MCHC 31.7 (L) 07/29/2020 07:56 AM       ABO/Rh  A NEG    ASSESSMENT/PLAN:     S/p vaginal delivery, PPD# 1. Doing well.   Patient Active Problem List   Diagnosis    Uterine contractions during pregnancy    Decreased fetal movement    Abdominal pain affecting pregnancy, antepartum    Abdominal pain affecting pregnancy      Continue isolation for COVID +  Routine postpartal care   Rhogam w/u   Baby at bedside  Dispo: anticipate discharge PPD#2 if meeting all criteria    Kelly Jordan MD  Obstetrics and Gynecology  [facility], later [facility]

## 2020-07-29 NOTE — NURSING
Assessment per flowsheet, pt remains on isolation in negative pressure room, VSS, educated pt on need to do cluster care Q4 hours but to call for any assistance she may need, verbalizes understanding, NAD noted

## 2020-07-29 NOTE — PLAN OF CARE
Pain Management and Care plan reviewed. Fundus firm, light lochia without clots or odor. Instructed patient to call nurse if experiencing heavy vaginal bleeding or clots.  Patient verbalized understanding. Voiding spontaneously and Ambulating independently; Demonstrates increase in activities of daily living; Encouraged to increase fluid intake.  Bonding well with . Encouraged patient to provide  with feedings every 2 to 3 hours. Patient verbalizes understanding.

## 2020-07-29 NOTE — PLAN OF CARE
Pt with adequate pain control with po pain medication. Lochia minimal. Voiding without difficulty. VSS. Denies needs at this time. NAD noted. Isolation precautions maintained.

## 2020-07-29 NOTE — NURSING TRANSFER
Nursing Transfer Note      7/28/2020     Transfer to room 2112 from room 2309    Transfer via wheelchair with patient wearing N95 mask    Transfer with patient's belongings    Transported by Zakiya Espinoza RN and baby transported in crib by Es Colmenares RN    Medicines sent: none    Chart send with patient: Yes    Patient reassessed at: July 28, 2020 at 2020    Upon arrival to floor: Patient and infant both brought in room 2112.  Report given to Sayra Martinez RN at 2025

## 2020-07-30 VITALS
HEIGHT: 66 IN | DIASTOLIC BLOOD PRESSURE: 65 MMHG | OXYGEN SATURATION: 100 % | HEART RATE: 78 BPM | TEMPERATURE: 98 F | BODY MASS INDEX: 26.52 KG/M2 | RESPIRATION RATE: 16 BRPM | SYSTOLIC BLOOD PRESSURE: 106 MMHG | WEIGHT: 165 LBS

## 2020-07-30 RX ORDER — OXYCODONE HYDROCHLORIDE 5 MG/1
5 TABLET ORAL EVERY 4 HOURS PRN
Qty: 14 TABLET | Refills: 0 | Status: SHIPPED | OUTPATIENT
Start: 2020-07-30

## 2020-07-30 RX ORDER — DOCUSATE SODIUM 100 MG/1
200 CAPSULE, LIQUID FILLED ORAL 2 TIMES DAILY PRN
Qty: 60 CAPSULE | Refills: 3 | Status: SHIPPED | OUTPATIENT
Start: 2020-07-30

## 2020-07-30 RX ORDER — DEXTROMETHORPHAN HYDROBROMIDE, GUAIFENESIN 5; 100 MG/5ML; MG/5ML
650 LIQUID ORAL EVERY 8 HOURS
Qty: 30 TABLET | Refills: 0 | Status: SHIPPED | OUTPATIENT
Start: 2020-07-30

## 2020-07-30 RX ORDER — FERROUS SULFATE 325(65) MG
325 TABLET ORAL
Qty: 30 TABLET | Refills: 3 | Status: SHIPPED | OUTPATIENT
Start: 2020-07-30

## 2020-07-30 NOTE — CARE UPDATE
Called Dr. Dawson Merged with [facility] office [phone] to make appointment for infant due to mother being covid positive.  Gave the office the baby's MRN number and mothers phone number to contact her directly so that arrangements can be made for the infants appointment.

## 2020-07-30 NOTE — LACTATION NOTE
This note was copied from a baby's chart.  Spoke to mom over the phone. Mom reports breastfeeding well. Mom denies any questions or concerns @ this time. Reinforced feeding cues & feeding frequency. Assistance offered prn. Mom verbalized understanding

## 2020-07-30 NOTE — DISCHARGE INSTRUCTIONS

## 2020-07-30 NOTE — PLAN OF CARE
Pain Management and Care plan reviewed. Currently denies pain or discomfort. Fundus firm, light lochia without clots or odor. Instructed patient to call nurse if experiencing heavy vaginal bleeding or clots. LPatient verbalized understanding. Voiding spontaneously and passing flatus; Ambulating independently; Demonstrates increase in activities of daily living; Encouraged to increase fluid intake. onding well with . Encouraged patient to provide  with feedings every 3 hours. Patient verbalizes understanding.

## 2020-07-30 NOTE — DISCHARGE SUMMARY
[facility]  Obstetrics  Discharge Summary      Patient Name: Kayla Marie Schoenberger  MRN: [id number]  Admission Date: 2020  Hospital Length of Stay: 2 days  Discharge Date and Time:  2020 9:45 AM  Attending Physician: Yaya Jordan, *   Discharging Provider: Yaya Jordan MD, MD   Primary Care Provider: Lyla Klein NP    Hospital Course:   25yo  @ 39w6d presented in active labor and precipitously delivered vaginally, overall uncomplicated despite pt being COVID +. Mother and infant recovered well. Pt meeting all goals and discharged today.     Consults (From admission, onward)        Status Ordering Provider     Consult to Lactation  Use PRN     Provider:  (Not yet assigned)    YAYA Mejia          Final Active Diagnoses:    Diagnosis Date Noted POA    PRINCIPAL PROBLEM:   (spontaneous vaginal delivery) [O80] 2020 Not Applicable    Abdominal pain affecting pregnancy [O26.899, R10.9] 2020 Yes      Problems Resolved During this Admission:        Significant Diagnostic Studies: Labs:   CBC   Recent Labs   Lab 20  0756   WBC 10.22   HGB 8.9*   HCT 28.1*        Lab Results   Component Value Date    GROUPTRH A NEG 2020         Feeding Method: breast    Immunizations     Date Immunization Status Dose Route/Site Given by    20 0052 Rho (D) Immune Globulin - IM Deleted 300 mcg Intramuscular/     20 0052 Rho (D) Immune Globulin - IM Deleted 300 mcg Intramuscular/     20 1625 Rho (D) Immune Globulin - IM Given 300 mcg Intramuscular/Right Ventrogluteal Rosalba Allen RN          Delivery:    Episiotomy: None   Lacerations: None   Repair suture: None   Repair # of packets:     Blood loss (ml): 200     Birth information:  YOB: 2022   Time of birth: 4:39 PM   Sex: female   Delivery type: Vaginal, Spontaneous   Gestational Age: 39w6d    Delivery Clinician:      Other providers:        Additional  information:  Forceps:    Vacuum:    Breech:    Observed anomalies      Living?:           APGARS  One minute Five minutes Ten minutes   Skin color:         Heart rate:         Grimace:         Muscle tone:         Breathing:         Totals: 8  8        Placenta: Delivered:       appearance    Pending Diagnostic Studies:     Procedure Component Value Units Date/Time    Specimen to Pathology - Surgery [[id number]] Collected: 07/28/20 1736    Order Status: Sent Lab Status: No result     Specimen: Tissue           Discharged Condition: good    Disposition: Home or Self Care    Follow Up:  Follow-up Information     Kelly Jordan MD, MD In 6 weeks.    Specialty: Obstetrics and Gynecology  Contact information:  [address] [place] [address]  [phone]                 Patient Instructions:      Diet Adult Regular     Pelvic Rest     Notify your health care provider if you experience any of the following:  temperature >100.4     Notify your health care provider if you experience any of the following:  persistent nausea and vomiting or diarrhea     Notify your health care provider if you experience any of the following:  severe uncontrolled pain     Notify your health care provider if you experience any of the following:  redness, tenderness, or signs of infection (pain, swelling, redness, odor or green/yellow discharge around incision site)     Other restrictions (specify):   Order Comments: Respiratory and contact precautions     Notify your health care provider if you experience any of the following:  difficulty breathing or increased cough     Notify your health care provider if you experience any of the following:  severe persistent headache     Notify your health care provider if you experience any of the following:  worsening rash     Notify your health care provider if you experience any of the following:  persistent dizziness, light-headedness, or visual disturbances     Notify your  health care provider if you experience any of the following:  increased confusion or weakness     Activity as tolerated     Medications:  Current Discharge Medication List      START taking these medications    Details   acetaminophen (TYLENOL) 650 MG TbSR Take 1 tablet (650 mg total) by mouth every 8 (eight) hours.  Qty: 30 tablet, Refills: 0      docusate sodium (COLACE) 100 MG capsule Take 2 capsules (200 mg total) by mouth 2 (two) times daily as needed for Constipation.  Qty: 60 capsule, Refills: 3      ferrous sulfate (FEOSOL) 325 mg (65 mg iron) Tab tablet Take 1 tablet (325 mg total) by mouth daily with breakfast.  Qty: 30 tablet, Refills: 3      oxyCODONE (ROXICODONE) 5 MG immediate release tablet Take 1 tablet (5 mg total) by mouth every 4 (four) hours as needed.  Qty: 14 tablet, Refills: 0    Comments: Quantity prescribed more than 7 day supply? No         STOP taking these medications       prenatal vit/iron fum/folic ac (PRENATAL 1+1 ORAL) Comments:   Reason for Stopping:               Kelly Jordan MD, MD  Obstetrics  [facility]

## 2020-07-31 LAB — BACTERIA UR CULT: NO GROWTH

## 2020-08-04 LAB
CHLAMYDIA, AMPLIFIED DNA: NEGATIVE
N GONORRHOEAE, AMPLIFIED DNA: NEGATIVE

## 2022-02-14 ENCOUNTER — PATIENT MESSAGE (OUTPATIENT)
Dept: RESEARCH | Facility: HOSPITAL | Age: 27
End: 2022-02-14
Payer: MEDICAID

## 2022-07-28 ENCOUNTER — HOSPITAL ENCOUNTER (EMERGENCY)
Facility: HOSPITAL | Age: 27
Discharge: HOME | End: 2022-07-28
Attending: EMERGENCY MEDICINE
Payer: MEDICAID

## 2022-07-28 VITALS
BODY MASS INDEX: 24.21 KG/M2 | HEART RATE: 100 BPM | SYSTOLIC BLOOD PRESSURE: 118 MMHG | RESPIRATION RATE: 18 BRPM | DIASTOLIC BLOOD PRESSURE: 69 MMHG | TEMPERATURE: 99 F | WEIGHT: 150 LBS | OXYGEN SATURATION: 100 %

## 2022-07-28 DIAGNOSIS — B00.9 HERPES: Primary | ICD-10-CM

## 2022-07-28 PROCEDURE — 99283 EMERGENCY DEPT VISIT LOW MDM: CPT

## 2022-07-28 PROCEDURE — 25000003 PHARM REV CODE 250: Performed by: EMERGENCY MEDICINE

## 2022-07-28 RX ORDER — ACYCLOVIR 800 MG/1
800 TABLET ORAL 2 TIMES DAILY
Qty: 10 TABLET | Refills: 0 | Status: SHIPPED | OUTPATIENT
Start: 2022-07-28 | End: 2022-08-02

## 2022-07-28 RX ORDER — ACYCLOVIR 800 MG/1
800 TABLET ORAL
Status: COMPLETED | OUTPATIENT
Start: 2022-07-28 | End: 2022-07-28

## 2022-07-28 RX ADMIN — ACYCLOVIR 800 MG: 800 TABLET ORAL at 11:07

## 2022-07-29 NOTE — FIRST PROVIDER EVALUATION
Emergency Department TeleTriage Encounter Note      CHIEF COMPLAINT    Chief Complaint   Patient presents with    Rash     Presents with herpes outback on L buttock that was noticed yesterday       VITAL SIGNS   Initial Vitals [07/28/22 2201]   BP Pulse Resp Temp SpO2   118/69 100 18 98.9 °F (37.2 °C) 100 %      MAP       --            ALLERGIES    Review of patient's allergies indicates:  No Known Allergies    PROVIDER TRIAGE NOTE  TeleTriage Note: Kayla Marie Schoenberger, a nontoxic/well appearing, 26 y.o. female, presented to the ED with c/o herpes outbreak that began today.     All ED beds are full at present; patient notified of this status.  Patient seen and medically screened by Nurse Practitioner via teletriage. Orders initiated at triage to expedite care.  Patient is stable to return to the waiting room and will be placed in an ED bed when available.  Care will be transferred to an alternate provider when patient has been placed in an Exam Room from the [facility] for physical exam, additional orders, and disposition.  10:01 PM Karine Azevedo DNP, FNP-C        ORDERS  Labs Reviewed   PREGNANCY TEST, URINE RAPID   URINALYSIS, REFLEX TO URINE CULTURE       ED Orders (720h ago, onward)    Start Ordered     Status Ordering Provider    07/28/22 2202 07/28/22 2202  Pregnancy, urine rapid  Once         Ordered KARINE AZEVEDO    07/28/22 2202 07/28/22 2202  Urinalysis, Reflex to Urine Culture Urine, Clean Catch  STAT         Ordered KARINE AZEVEDO            Virtual Visit Note: The provider triage portion of this emergency department evaluation and documentation was performed via [employer], a HIPAA-compliant telemedicine application, in concert with a tele-presenter in the room. A face to face patient evaluation with one of my colleagues will occur once the patient is placed in an emergency department room.      DISCLAIMER: This note was prepared with [employer] voice recognition transcription software.  Garbled syntax, mangled pronouns, and other bizarre constructions may be attributed to that software system.

## 2022-07-29 NOTE — ED PROVIDER NOTES
Encounter Date: 7/28/2022       History     Chief Complaint   Patient presents with    Rash     Presents with herpes outback on L buttock that was noticed yesterday     26-year-old with history of genital herpes presents with rash to the left lower posterior buttock consistent prior herpes outbreak.  Patient reports she is under lot of stress and is about to be kicked out of her house.  She has no other complaints.  She has no vaginal lesions or discharge.  No fevers or chills.  She states in the past when she has had these outbreaks she has responded well to antiviral medications.  She does report a similar outbreak in this exact location.  She states at this time the rash is itchy rather than painful.  It was 1st noticed this morning.    The history is provided by the patient.     Review of patient's allergies indicates:  No Known Allergies  Past Medical History:   Diagnosis Date    Hard of hearing     Herpes genitalis in women      No past surgical history on file.  No family history on file.  Social History     Tobacco Use    Smoking status: Never Smoker    Smokeless tobacco: Never Used   Substance Use Topics    Alcohol use: No    Drug use: No     Review of Systems   Constitutional: Negative for fever.   Gastrointestinal: Negative.    Genitourinary: Negative.    Skin: Positive for rash.       Physical Exam     Initial Vitals [07/28/22 2201]   BP Pulse Resp Temp SpO2   118/69 100 18 98.9 °F (37.2 °C) 100 %      MAP       --         Physical Exam    Nursing note and vitals reviewed.  Constitutional: She appears well-developed and well-nourished.   HENT:   Head: Normocephalic and atraumatic.   Eyes: EOM are normal.   Neck: Neck supple.   Normal range of motion.  Cardiovascular: Normal rate, regular rhythm and normal heart sounds. Exam reveals no gallop and no friction rub.    No murmur heard.  Pulmonary/Chest: Breath sounds normal. No respiratory distress. She has no wheezes. She has no rhonchi. She has no  rales.   Genitourinary:       Musculoskeletal:         General: Normal range of motion.      Cervical back: Normal range of motion and neck supple.     Neurological: She is alert and oriented to person, place, and time.   Skin: Skin is warm and dry.   Psychiatric: She has a normal mood and affect. Her behavior is normal. Judgment and thought content normal.         ED Course   Procedures  Labs Reviewed - No data to display       Imaging Results    None          Medications   acyclovir tablet 800 mg (800 mg Oral Given 7/28/22 2328)     Medical Decision Making:   History:   Old Medical Records: I decided to obtain old medical records.             ED Course as of 07/28/22 2340   u Jul 28, 2022 2301 BP: 118/69 [EF]   2301 Temp: 98.9 °F (37.2 °C) [EF]   2301 Temp src: Oral [EF]   2301 Pulse: 100 [EF]   2301 Resp: 18 [EF]   2301 SpO2: 100 % [EF]      ED Course User Index  [EF] Silvano Her MD             Clinical Impression:   Final diagnoses:  [B00.9] Herpes (Primary)          ED Disposition Condition    Discharge Stable        ED Prescriptions     Medication Sig Dispense Start Date End Date Auth. Provider    acyclovir (ZOVIRAX) 800 MG Tab Take 1 tablet (800 mg total) by mouth 2 (two) times daily. for 5 days 10 tablet 7/28/2022 8/2/2022 Silvano Her MD        Follow-up Information     Follow up With Specialties Details Why Contact Info    [facility] Emergency Dept Emergency Medicine  If symptoms worsen, As needed [address] [place] [id number] [phone]        26-year-old female with history of genital herpes presents with a rash to the left lower buttock that she reports is similar to prior herpes outbreak.  She has some very small pustules and vesicles that are itchy at this time.  This began today she will be placed on acyclovir.  I do not suspect monkey pox.     Silvano Her MD  07/28/22 2340

## 2023-04-28 ENCOUNTER — HOSPITAL ENCOUNTER (EMERGENCY)
Facility: HOSPITAL | Age: 28
Discharge: PSYCHIATRIC OR SUBSTANCE USE FACILITY | End: 2023-04-28
Attending: EMERGENCY MEDICINE
Payer: MEDICAID

## 2023-04-28 VITALS
DIASTOLIC BLOOD PRESSURE: 76 MMHG | HEART RATE: 91 BPM | SYSTOLIC BLOOD PRESSURE: 131 MMHG | TEMPERATURE: 98 F | BODY MASS INDEX: 24.21 KG/M2 | WEIGHT: 150 LBS | RESPIRATION RATE: 18 BRPM | OXYGEN SATURATION: 99 %

## 2023-04-28 DIAGNOSIS — N39.0 URINARY TRACT INFECTION WITHOUT HEMATURIA, SITE UNSPECIFIED: ICD-10-CM

## 2023-04-28 DIAGNOSIS — R45.1 AGITATION: ICD-10-CM

## 2023-04-28 DIAGNOSIS — Z00.8 MEDICAL CLEARANCE FOR PSYCHIATRIC ADMISSION: Primary | ICD-10-CM

## 2023-04-28 DIAGNOSIS — F41.9 ANXIETY: ICD-10-CM

## 2023-04-28 LAB
ALBUMIN SERPL BCP-MCNC: 4.1 G/DL (ref 3.5–5.2)
ALP SERPL-CCNC: 43 U/L (ref 55–135)
ALT SERPL W/O P-5'-P-CCNC: 13 U/L (ref 10–44)
AMPHET+METHAMPHET UR QL: NEGATIVE
ANION GAP SERPL CALC-SCNC: 8 MMOL/L (ref 8–16)
APAP SERPL-MCNC: <10 UG/ML (ref 10–20)
AST SERPL-CCNC: 18 U/L (ref 10–40)
BACTERIA #/AREA URNS HPF: ABNORMAL /HPF
BARBITURATES UR QL SCN>200 NG/ML: NEGATIVE
BASOPHILS # BLD AUTO: 0.07 K/UL (ref 0–0.2)
BASOPHILS NFR BLD: 0.8 % (ref 0–1.9)
BENZODIAZ UR QL SCN>200 NG/ML: NEGATIVE
BILIRUB SERPL-MCNC: 0.7 MG/DL (ref 0.1–1)
BILIRUB UR QL STRIP: NEGATIVE
BUN SERPL-MCNC: 12 MG/DL (ref 6–20)
BZE UR QL SCN: NEGATIVE
CALCIUM SERPL-MCNC: 8.9 MG/DL (ref 8.7–10.5)
CANNABINOIDS UR QL SCN: NEGATIVE
CHLORIDE SERPL-SCNC: 104 MMOL/L (ref 95–110)
CLARITY UR: ABNORMAL
CO2 SERPL-SCNC: 25 MMOL/L (ref 23–29)
COLOR UR: YELLOW
CREAT SERPL-MCNC: 0.8 MG/DL (ref 0.5–1.4)
CREAT UR-MCNC: 232 MG/DL (ref 15–325)
DIFFERENTIAL METHOD: ABNORMAL
EOSINOPHIL # BLD AUTO: 0 K/UL (ref 0–0.5)
EOSINOPHIL NFR BLD: 0.1 % (ref 0–8)
ERYTHROCYTE [DISTWIDTH] IN BLOOD BY AUTOMATED COUNT: 14.8 % (ref 11.5–14.5)
EST. GFR  (NO RACE VARIABLE): >60 ML/MIN/1.73 M^2
ETHANOL SERPL-MCNC: <5 MG/DL
GLUCOSE SERPL-MCNC: 106 MG/DL (ref 70–110)
GLUCOSE UR QL STRIP: NEGATIVE
HCT VFR BLD AUTO: 38 % (ref 37–48.5)
HGB BLD-MCNC: 12.2 G/DL (ref 12–16)
HGB UR QL STRIP: NEGATIVE
HYALINE CASTS #/AREA URNS LPF: 91 /LPF
IMM GRANULOCYTES # BLD AUTO: 0.03 K/UL (ref 0–0.04)
IMM GRANULOCYTES NFR BLD AUTO: 0.3 % (ref 0–0.5)
KETONES UR QL STRIP: NEGATIVE
LEUKOCYTE ESTERASE UR QL STRIP: ABNORMAL
LYMPHOCYTES # BLD AUTO: 1.4 K/UL (ref 1–4.8)
LYMPHOCYTES NFR BLD: 16.7 % (ref 18–48)
MCH RBC QN AUTO: 28.2 PG (ref 27–31)
MCHC RBC AUTO-ENTMCNC: 32.1 G/DL (ref 32–36)
MCV RBC AUTO: 88 FL (ref 82–98)
MICROSCOPIC COMMENT: ABNORMAL
MONOCYTES # BLD AUTO: 0.5 K/UL (ref 0.3–1)
MONOCYTES NFR BLD: 5.7 % (ref 4–15)
NEUTROPHILS # BLD AUTO: 6.6 K/UL (ref 1.8–7.7)
NEUTROPHILS NFR BLD: 76.4 % (ref 38–73)
NITRITE UR QL STRIP: NEGATIVE
NRBC BLD-RTO: 0 /100 WBC
OPIATES UR QL SCN: NEGATIVE
PCP UR QL SCN>25 NG/ML: NEGATIVE
PH UR STRIP: 7 [PH] (ref 5–8)
PLATELET # BLD AUTO: 248 K/UL (ref 150–450)
PMV BLD AUTO: 9.7 FL (ref 9.2–12.9)
POTASSIUM SERPL-SCNC: 3.9 MMOL/L (ref 3.5–5.1)
PROT SERPL-MCNC: 7.9 G/DL (ref 6–8.4)
PROT UR QL STRIP: ABNORMAL
RBC # BLD AUTO: 4.33 M/UL (ref 4–5.4)
RBC #/AREA URNS HPF: 1 /HPF (ref 0–4)
SALICYLATES SERPL-MCNC: <4 MG/DL (ref 15–30)
SODIUM SERPL-SCNC: 137 MMOL/L (ref 136–145)
SP GR UR STRIP: 1.03 (ref 1–1.03)
SQUAMOUS #/AREA URNS HPF: 35 /HPF
TOXICOLOGY INFORMATION: NORMAL
TSH SERPL DL<=0.005 MIU/L-ACNC: 0.71 UIU/ML (ref 0.34–5.6)
URN SPEC COLLECT METH UR: ABNORMAL
UROBILINOGEN UR STRIP-ACNC: NEGATIVE EU/DL
WBC # BLD AUTO: 8.6 K/UL (ref 3.9–12.7)
WBC #/AREA URNS HPF: >100 /HPF (ref 0–5)

## 2023-04-28 PROCEDURE — 81001 URINALYSIS AUTO W/SCOPE: CPT | Performed by: NURSE PRACTITIONER

## 2023-04-28 PROCEDURE — 85025 COMPLETE CBC W/AUTO DIFF WBC: CPT | Performed by: NURSE PRACTITIONER

## 2023-04-28 PROCEDURE — 82077 ASSAY SPEC XCP UR&BREATH IA: CPT | Performed by: NURSE PRACTITIONER

## 2023-04-28 PROCEDURE — 99205 PR OFFICE/OUTPT VISIT, NEW, LEVL V, 60-74 MIN: ICD-10-PCS | Mod: 95,,, | Performed by: PSYCHIATRY & NEUROLOGY

## 2023-04-28 PROCEDURE — 87086 URINE CULTURE/COLONY COUNT: CPT | Performed by: NURSE PRACTITIONER

## 2023-04-28 PROCEDURE — 80053 COMPREHEN METABOLIC PANEL: CPT | Performed by: NURSE PRACTITIONER

## 2023-04-28 PROCEDURE — 80179 DRUG ASSAY SALICYLATE: CPT | Performed by: NURSE PRACTITIONER

## 2023-04-28 PROCEDURE — 99205 OFFICE O/P NEW HI 60 MIN: CPT | Mod: 95,,, | Performed by: PSYCHIATRY & NEUROLOGY

## 2023-04-28 PROCEDURE — 99285 EMERGENCY DEPT VISIT HI MDM: CPT

## 2023-04-28 PROCEDURE — 80143 DRUG ASSAY ACETAMINOPHEN: CPT | Performed by: NURSE PRACTITIONER

## 2023-04-28 PROCEDURE — 80307 DRUG TEST PRSMV CHEM ANLYZR: CPT | Performed by: NURSE PRACTITIONER

## 2023-04-28 PROCEDURE — 84443 ASSAY THYROID STIM HORMONE: CPT | Performed by: NURSE PRACTITIONER

## 2023-04-28 PROCEDURE — 25000003 PHARM REV CODE 250: Performed by: EMERGENCY MEDICINE

## 2023-04-28 RX ORDER — OLANZAPINE 5 MG/1
10 TABLET ORAL
Status: COMPLETED | OUTPATIENT
Start: 2023-04-28 | End: 2023-04-28

## 2023-04-28 RX ORDER — CEFUROXIME AXETIL 250 MG/1
500 TABLET ORAL
Status: COMPLETED | OUTPATIENT
Start: 2023-04-28 | End: 2023-04-28

## 2023-04-28 RX ORDER — CEFUROXIME AXETIL 500 MG/1
500 TABLET ORAL EVERY 12 HOURS
Qty: 20 TABLET | Refills: 0 | Status: SHIPPED | OUTPATIENT
Start: 2023-04-28 | End: 2023-10-27

## 2023-04-28 RX ADMIN — OLANZAPINE 10 MG: 5 TABLET, FILM COATED ORAL at 09:04

## 2023-04-28 RX ADMIN — CEFUROXIME AXETIL 500 MG: 250 TABLET, FILM COATED ORAL at 09:04

## 2023-04-28 NOTE — ED NOTES
"Rounded on pt, pt updated on POC. Pt states "this is wrong my momma lied on me I ain't never hurt myself or no body else in my life she just wants me out the house". Pt visibly upset but cooperative with staff at this time. Chest rising and falling, respirations E/U, NADN. Bed in lowest and locked position, SR ^ X 2, call light in reach. Comfort positioning and restroom needs addressed. Sitter maintaining visual contact.  "

## 2023-04-29 PROBLEM — B00.9 HSV-2 (HERPES SIMPLEX VIRUS 2) INFECTION: Status: ACTIVE | Noted: 2023-04-29

## 2023-04-29 PROBLEM — N30.00 ACUTE CYSTITIS WITHOUT HEMATURIA: Status: ACTIVE | Noted: 2023-04-29

## 2023-04-29 NOTE — CONSULTS
"[facility]  Psychiatry  Telepsychiatry Consult Note    Please see previous notes:    Patient agreeable to consultation via telepsychiatry.    Tele-Consultation from Psychiatry started: 4/28/2023 at 8:33pm  The chief complaint leading to psychiatric consultation is: HI   This consultation was requested by Dr Costa, the Emergency Department attending physician.  The location of the consulting psychiatrist is  [place] .  The patient location is  [facility] EMERGENCY DEPARTMENT   The patient arrived at the ED at: [facility]    Also present with the patient at the time of the consultation: nobody    Patient Identification:   Kayla Marie Schoenberger is a 27 y.o. female.    Patient information was obtained from patient and past medical records.  Patient presented involuntarily to the Emergency Department     Consults  Teleconsult Time Documentation  Subjective:     History of Present Illness:  26yo F with hx of ADHD brought in on OPC for HI    Per ED note- "27-year-old female presented emergency department brought in by police as she was placed under OPC.  Patient denies any complaints.  Patient admits to having an argument with her mother and after that OPC was done.  Patient denies threatening to kill the mother.  OPC states patient is homicidal and made threats to kill her mother.  Patient denies any drug use and denies any psychiatric history.  Denies fever chills or nausea vomiting or chest pain or shortness of breath or abdominal pain or weakness or numbness.  Patient states there lying.  Patient however admits to having an argument with mother"    On interview, patient was loud, angry, and yelling. I could barely interrupt her. Patient reports "my mother and I had a verbal altercation this morning.. I don't really recall what it was because it was irrelevant.. I did not threaten anyone. This argument was irrelevant obviously. 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  This is the extent of patients complaints at this time  Unable to obtain ros due to patient not answering questions  I call patients mother at number in chart at [phone]. Mother reports "it has been an ongoing thing, she gets in peoples face, threatens them... she threatened to beat my ass..she does not care.. she fears nothing.. she has been kicking my dog... she is yelling at my demented mother threatening to beat her..." Mother reports patient does not sleep much and her anger has worsened lately to point where she fears for her wellbeing.        Psychiatric History:   ADHD as a child. Mother reports patient was diagnosed with bipolar as a child  Previous Psychiatric Hospitalizations: No   Previous Medication Trials: adderall, risperdal, ritalin, abilify per mother  Previous Suicide Attempts: no   History of Violence: no  History of Depression: no  History of Wendy: no  History of Auditory/Visual Hallucination no  History of Delusions: no  Outpatient psychiatrist (current & past): No    Substance Abuse History:  Tobacco: no  Alcohol: no  Illicit Substances:No  Detox/Rehab: No    Legal History: Past charges/incarcerations: No     Family Psychiatric History: grandmother- dementia  Fathers side "all nuts" per mother      Social History:  Works as a dancer, 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 Domiciled.     Psychiatric Mental Status Exam:  Arousal: alert  Sensorium/Orientation: oriented to grossly intact  Behavior/Cooperation: reluctant to participate   Speech: loud, pressured  Language: not tested  Mood: " I am fine "   Affect: angry  Thought Process: circumstantial, tangential  Thought Content: focused on her stressors, somewhat grandiose  Auditory hallucinations: NO  Visual hallucinations: NO  Paranoia: yes  Delusions:  NO  Suicidal ideation: NO  Homicidal ideation: " NO  Attention/Concentration:  Impaired-  Memory:    Recent:  Intact   Remote: Intact     Fund of Knowledge: Aware of current events   Abstract reasoning: similarities were abstract  Insight: limited awareness of illness  Judgment: limited      Past Medical History:   Past Medical History:   Diagnosis Date    Hard of hearing     Herpes genitalis in women       Laboratory Data:   Labs Reviewed   CBC W/ AUTO DIFFERENTIAL - Abnormal; Notable for the following components:       Result Value    RDW 14.8 (*)     Gran % 76.4 (*)     Lymph % 16.7 (*)     All other components within normal limits   COMPREHENSIVE METABOLIC PANEL - Abnormal; Notable for the following components:    Alkaline Phosphatase 43 (*)     All other components within normal limits   URINALYSIS, REFLEX TO URINE CULTURE - Abnormal; Notable for the following components:    Appearance, UA Cloudy (*)     Protein, UA 1+ (*)     Leukocytes, UA 3+ (*)     All other components within normal limits    Narrative:     Specimen Source->Urine   SALICYLATE LEVEL - Abnormal; Notable for the following components:    Salicylate Lvl <4.0 (*)     All other components within normal limits   URINALYSIS MICROSCOPIC - Abnormal; Notable for the following components:    WBC, UA >100 (*)     Bacteria Many (*)     Hyaline Casts, UA 91 (*)     All other components within normal limits    Narrative:     Specimen Source->Urine   CULTURE, URINE   TSH   DRUG SCREEN PANEL, URINE EMERGENCY    Narrative:     Specimen Source->Urine   ALCOHOL,MEDICAL (ETHANOL)   ACETAMINOPHEN LEVEL   POCT URINE PREGNANCY         Allergies:   Review of patient's allergies indicates:  No Known Allergies    Medications in ER: Medications - No data to display    Medications at home: none    No new subjective & objective note has been filed under this hospital service since the last note was generated.      Assessment - Diagnosis - Goals:     Diagnosis/Impression:  Bipolar disorder- currently manic- elevated  likelihood    Rec:   Continue PEC given risk of harm to others  Zyprexa 10mg IM q 6 hours prn agitation  Will defer to inpatient psychiatric team to start/modify scheduled medications    Time with patient, speaking with collateral, coordinating care: 31min      More than 50% of the time was spent counseling/coordinating care    Consulting clinician was informed of the encounter and consult note.    Consultation ended: 4/28/2023 at 9:10pm    Selvin Martínez MD  Psychiatry  [facility]

## 2023-04-29 NOTE — ED NOTES
Pt in no distress at this time.  Pt calm.  Pt stating she would like to go home to take care of her children.  Pt denies any HI or SI since speaking to the pt multiple times over the last hour

## 2023-04-29 NOTE — PLAN OF CARE
met with patient at bedside with RN in ED22 to explain OPC, PEC, telepsych evaluation process. Patient verbalized understanding.  observed patient to be calm and cooperative. She denied any past or current homicidal / suicidal ideations. She acknowledges that her and her Mother had a disagreement, but she denies ever threatening to kill anyone. Patient expressed logical and goal oriented thought process as she states her main concern is the welfare of her children and her concern for missing work.

## 2023-04-30 LAB
BACTERIA UR CULT: NORMAL
BACTERIA UR CULT: NORMAL

## 2023-05-02 PROBLEM — F43.24 ADJUSTMENT DISORDER WITH DISTURBANCE OF CONDUCT: Status: ACTIVE | Noted: 2023-05-02

## 2023-10-08 ENCOUNTER — HOSPITAL ENCOUNTER (EMERGENCY)
Facility: HOSPITAL | Age: 28
Discharge: LEFT BEFORE DISCHARGE | End: 2023-10-08
Attending: EMERGENCY MEDICINE

## 2023-10-08 VITALS
RESPIRATION RATE: 17 BRPM | HEART RATE: 82 BPM | TEMPERATURE: 99 F | WEIGHT: 146.63 LBS | DIASTOLIC BLOOD PRESSURE: 70 MMHG | BODY MASS INDEX: 24.43 KG/M2 | OXYGEN SATURATION: 100 % | HEIGHT: 65 IN | SYSTOLIC BLOOD PRESSURE: 127 MMHG

## 2023-10-08 DIAGNOSIS — Z34.91 PREGNANT AND NOT YET DELIVERED IN FIRST TRIMESTER: Primary | ICD-10-CM

## 2023-10-08 DIAGNOSIS — B96.89 BACTERIAL VAGINOSIS: ICD-10-CM

## 2023-10-08 DIAGNOSIS — N76.0 BACTERIAL VAGINOSIS: ICD-10-CM

## 2023-10-08 LAB
B-HCG UR QL: POSITIVE
BACTERIA GENITAL QL WET PREP: ABNORMAL
BILIRUB UR QL STRIP: NEGATIVE
CLARITY UR: CLEAR
CLUE CELLS VAG QL WET PREP: ABNORMAL
COLOR UR: YELLOW
CTP QC/QA: YES
FILAMENT FUNGI VAG WET PREP-#/AREA: ABNORMAL
GLUCOSE UR QL STRIP: NEGATIVE
HGB UR QL STRIP: NEGATIVE
KETONES UR QL STRIP: NEGATIVE
LEUKOCYTE ESTERASE UR QL STRIP: NEGATIVE
NITRITE UR QL STRIP: NEGATIVE
PH UR STRIP: 7 [PH] (ref 5–8)
PROT UR QL STRIP: NEGATIVE
SP GR UR STRIP: 1.01 (ref 1–1.03)
SPECIMEN SOURCE: ABNORMAL
T VAGINALIS GENITAL QL WET PREP: ABNORMAL
URN SPEC COLLECT METH UR: NORMAL
UROBILINOGEN UR STRIP-ACNC: NEGATIVE EU/DL
WBC #/AREA VAG WET PREP: ABNORMAL
YEAST GENITAL QL WET PREP: ABNORMAL

## 2023-10-08 PROCEDURE — 81003 URINALYSIS AUTO W/O SCOPE: CPT | Performed by: EMERGENCY MEDICINE

## 2023-10-08 PROCEDURE — 87591 N.GONORRHOEAE DNA AMP PROB: CPT | Performed by: EMERGENCY MEDICINE

## 2023-10-08 PROCEDURE — 87210 SMEAR WET MOUNT SALINE/INK: CPT | Performed by: EMERGENCY MEDICINE

## 2023-10-08 PROCEDURE — 99283 EMERGENCY DEPT VISIT LOW MDM: CPT

## 2023-10-08 PROCEDURE — 87491 CHLMYD TRACH DNA AMP PROBE: CPT | Performed by: EMERGENCY MEDICINE

## 2023-10-08 PROCEDURE — 81025 URINE PREGNANCY TEST: CPT | Performed by: EMERGENCY MEDICINE

## 2023-10-08 RX ORDER — METRONIDAZOLE 500 MG/1
500 TABLET ORAL EVERY 12 HOURS
Qty: 14 TABLET | Refills: 0 | Status: SHIPPED | OUTPATIENT
Start: 2023-10-08 | End: 2023-10-15

## 2023-10-08 RX ORDER — PRENATAL WITH FERROUS FUM AND FOLIC ACID 3080; 920; 120; 400; 22; 1.84; 3; 20; 10; 1; 12; 200; 27; 25; 2 [IU]/1; [IU]/1; MG/1; [IU]/1; MG/1; MG/1; MG/1; MG/1; MG/1; MG/1; UG/1; MG/1; MG/1; MG/1; MG/1
1 TABLET ORAL DAILY
Qty: 30 TABLET | Refills: 8 | Status: SHIPPED | OUTPATIENT
Start: 2023-10-08 | End: 2024-10-07

## 2023-10-09 ENCOUNTER — HOSPITAL ENCOUNTER (EMERGENCY)
Facility: HOSPITAL | Age: 28
Discharge: HOME | End: 2023-10-09
Attending: STUDENT IN AN ORGANIZED HEALTH CARE EDUCATION/TRAINING PROGRAM

## 2023-10-09 ENCOUNTER — PATIENT OUTREACH (OUTPATIENT)
Dept: EMERGENCY MEDICINE | Facility: HOSPITAL | Age: 28
End: 2023-10-09

## 2023-10-09 VITALS
TEMPERATURE: 98 F | DIASTOLIC BLOOD PRESSURE: 55 MMHG | OXYGEN SATURATION: 100 % | SYSTOLIC BLOOD PRESSURE: 114 MMHG | HEART RATE: 67 BPM | WEIGHT: 140 LBS | HEIGHT: 64 IN | BODY MASS INDEX: 23.9 KG/M2 | RESPIRATION RATE: 16 BRPM

## 2023-10-09 DIAGNOSIS — Z34.91 FIRST TRIMESTER PREGNANCY: Primary | ICD-10-CM

## 2023-10-09 PROCEDURE — 99284 EMERGENCY DEPT VISIT MOD MDM: CPT

## 2023-10-09 NOTE — DISCHARGE INSTRUCTIONS
You need an ultrasound to confirm an intrauterine pregnancy.  You need to call your OBGYN tomorrow to schedule follow-up appointment.  You need to take your prenatal vitamins.  If you are smoker, stopped smoking.  You also have bacterial vaginosis which is not an STD, but a vaginal infection that can cause pelvic discomfort.  You should take your antibiotics.  We wanted to order an ultrasound on you in the emergency room, but she left against medical advice and you need to get an ultrasound done to confirm an intrauterine pregnancy.  This should be done as soon as possible to rule out ectopic pregnancy.  You can return to the emergency room at any time for an ultrasound.  However this emergency room does not have an ultrasonographer until 8:00 a.m. tomorrow morning.  I would encourage you to go to any other ER to get an ultrasound.

## 2023-10-09 NOTE — ED PROVIDER NOTES
Encounter Date: 10/8/2023       History     Chief Complaint   Patient presents with    Abdominal Pain     Lmp in August.   Wants a pregnancy test       Patient is a  last known menstrual cycle the end of August who presents the emergency room wanting to know if she is pregnant.  She is not taken home pregnancy test.  She also has some mild lower pelvic discomfort.  She denies any fevers but does have some white vaginal discharge.  She denies any history of gonorrhea chlamydia but her significant other does have a history of urethritis in the past.  He would not further elaborate.  She has no vomiting fevers constipation vaginal bleeding.  She denies any history of ectopic pregnancy.  She does have a history of genital herpes.      Review of patient's allergies indicates:  No Known Allergies  Past Medical History:   Diagnosis Date    Hard of hearing     Herpes genitalis in women      History reviewed. No pertinent surgical history.  History reviewed. No pertinent family history.  Social History     Tobacco Use    Smoking status: Never    Smokeless tobacco: Never   Substance Use Topics    Alcohol use: No    Drug use: No     Review of Systems   Constitutional:  Negative for appetite change and fever.   HENT:  Negative for congestion, ear pain, rhinorrhea and sore throat.    Eyes:  Negative for pain and visual disturbance.   Respiratory:  Negative for cough, choking and shortness of breath.    Cardiovascular:  Negative for chest pain.   Gastrointestinal:  Negative for abdominal pain, diarrhea, nausea and vomiting.   Genitourinary:  Positive for pelvic pain. Negative for difficulty urinating, dysuria, flank pain, hematuria, vaginal bleeding and vaginal discharge.   Musculoskeletal:  Negative for arthralgias and back pain.   Skin:  Negative for rash.   Neurological:  Negative for syncope, weakness, numbness and headaches.   All other systems reviewed and are negative.      Physical Exam     Initial Vitals [10/08/23  2026]   BP Pulse Resp Temp SpO2   127/70 82 17 98.5 °F (36.9 °C) 100 %      MAP       --         Physical Exam    Nursing note and vitals reviewed.  Constitutional: She appears well-developed and well-nourished.  Non-toxic appearance. No distress.   HENT:   Head: Normocephalic and atraumatic.   Mouth/Throat: Oropharynx is clear and moist.   Eyes: Conjunctivae and EOM are normal. Pupils are equal, round, and reactive to light.   Neck: Neck supple.   Normal range of motion.  Cardiovascular:  Normal rate, regular rhythm, normal heart sounds and intact distal pulses.     Exam reveals no gallop and no friction rub.       No murmur heard.  Pulmonary/Chest: Breath sounds normal. No respiratory distress. She has no decreased breath sounds. She has no wheezes. She has no rhonchi. She has no rales.   Abdominal: Abdomen is soft. Bowel sounds are normal. She exhibits no distension. There is no abdominal tenderness.   No significant abdominal tenderness There is no rebound.   Musculoskeletal:         General: No edema. Normal range of motion.      Cervical back: Normal range of motion and neck supple.     Neurological: She is alert and oriented to person, place, and time. She has normal strength. No sensory deficit. GCS score is 15. GCS eye subscore is 4. GCS verbal subscore is 5. GCS motor subscore is 6.   Skin: Skin is warm and dry. No rash noted.   Psychiatric: She has a normal mood and affect.         ED Course   Procedures  Labs Reviewed   VAGINAL SCREEN - Abnormal; Notable for the following components:       Result Value    Clue Cells Moderate (*)     WBC - Vaginal Screen Few (*)     Bacteria - Vaginal Screen Moderate (*)     All other components within normal limits    Narrative:     Release to patient->Immediate   POCT URINE PREGNANCY - Abnormal; Notable for the following components:    POC Preg Test, Ur Positive (*)     All other components within normal limits   C. TRACHOMATIS/N. GONORRHOEAE BY AMP DNA   URINALYSIS,  REFLEX TO URINE CULTURE    Narrative:     Specimen Source->Urine   C.TRACH/N.GONOR AMP RNA, VARIES          Imaging Results    None          Medications - No data to display  Medical Decision Making  Amount and/or Complexity of Data Reviewed  Labs: ordered.    Risk  Prescription drug management.               ED Course as of 10/08/23 2214   Sun Oct 08, 2023   2207 Patient has a positive pregnancy test.  I instructed her that she needs an ultrasound she is having abdominal pain and pregnancy.  The patient left against medical advice.  She did not want to stay for for an ultrasound.  I explained her that she does need an ultrasound if she is having abdominal pain in early pregnancy.  She has no vaginal bleeding at this time.  She does have moderate clue cells.  Given she has some mild pelvic discomfort with white vaginal discharge I will treat her for bacterial vaginosis and pregnancy.  No evidence of urinary tract infection.  I do not believe she has cervicitis.  I do not think she needs presumptive treatment for gonorrhea chlamydia at this time.  I have encouraged her to follow up with her OBGYN and call tomorrow to schedule follow-up appointment.  The patient also will return to the emergency room as soon as possible for her ultrasound.  I did a bedside ultrasound which shows a gestational sac but I can not confirm that she does not have an ectopic pregnancy is I am not a formal ultrasonography. [JS]      ED Course User Index  [JS] Librado Marie MD                    Clinical Impression:   Final diagnoses:  [Z34.91] Pregnant and not yet delivered in first trimester (Primary)  [N76.0, B96.89] Bacterial vaginosis        ED Disposition Condition    AMA Stable                Librado Marie MD  10/08/23 2214

## 2023-10-09 NOTE — PROGRESS NOTES
Lisseth Hicks  ED Navigator  Emergency Department    [place] [facility]  Role: Community Health Worker    Date: 10/09/2023  Patient Name: Kayla Schoenberger  MRN: [id number]  PCP: Lyla Klein NP (Inactive)    Assessment:     Kayla Marie Schoenberger is a 28 y.o. female who has presented to ED for Abdominal Pain. Patient has visited the ED 1 times in the past 3 months. Patient did not contact PCP.     ED Navigator Initial Assessment    ED Navigator Enrollment Documentation  Consent to Services  Does patient consent to completing the assessment?: Yes  Contact  Method of Initial Contact: Phone  Transportation  Does the patient have issues with Transportation?: No  Does the patient have transportation to and from healthcare appointments?: Yes  Insurance Coverage  Do you have coverage/adequate coverage?: No  Reason for no/inadequate coverage: Uninsured  Specialist Appointment  Did the patient come to the ED to see a specialist?: No  Does the patient have a pending specialist referral?: No  Does the patient have a specialist appointment made?: No  PCP Follow Up Appointment  Has the patient had an appointment with a primary care provider in the past year?: No  Does the patient have a follow up appontment with a PCP?: No  When was the last time you saw your PCP?: 10/9/22  Why does the patient not have a follow up scheduled?: No established [facility]/outside PCP  Would you like an [facility] PCP?: Yes  Medications  Is patient able to afford medication?: Yes  Is patient unable to get medication due to lack of transportation?: No  Psychological  Does the patient have psycho-social concerns?: No  Food  Does the patient have concerns about food?: Yes  What concerns does the patient have regarding food?: Lack of food  Communication/Education  Does the patient have limited English proficiency/English not primary language?: No  Does patient have low literacy and/or low health literacy?: Yes  Does patient have concerns with  care?: No  Does patient have dissatisfaction with care?: No  Other Financial Concerns  Does the patient have immediate financial distress?: No  Does the patient have general financial concerns?: No  Other Social Barriers/Concerns  Does the patient have any additional barriers or concerns?: Other (see comments)  Primary Barrier  Barriers identified: Financial barrier (health insurance, employment, etc.)  Root Cause of ED Utilization: Patient Knowledge/Low Health Literacy  Plan to address Patient Knowledge/Low Health Literacy: Provided information for [facility] 24/7 Nurse triage line [phone] or [facility] ([phone]), Provided information for [facility] ([facility], [employer], Et[employer])  Next steps: Provided Education  Was education/educational materials provided surrounding PCP services/creating a medical home?: Yes Was education verbal or written?: Written     Was education/educational materials provided surrounding low cost, healthy foods?: Yes Was education verbal or written?: Written     Was education/educational materials provided surrounding other items? If so, use comment to explain.: Yes Was education verbal or written?: Written   Plan: Provided information for [facility] ([facility]-Ex: [facility], [facility], [employer]tc..)  Expected Date of Follow Up 1: 11/6/23         Social History     Socioeconomic History    Marital status: Single   Tobacco Use    Smoking status: Never    Smokeless tobacco: Never   Substance and Sexual Activity    Alcohol use: No    Drug use: No    Sexual activity: Yes     Partners: Male     Social Determinants of Health     Financial Resource Strain: Low Risk  (10/9/2023)    Overall Financial Resource Strain (CARDIA)     Difficulty of Paying Living Expenses: Not very hard   Food Insecurity: Food Insecurity Present (10/9/2023)    Hunger Vital Sign      Worried About Running Out of Food in the Last Year: Sometimes true     Ran Out of Food in the Last Year: Sometimes true   Transportation Needs: No Transportation Needs (10/9/2023)    PRAPARE - Transportation     Lack of Transportation (Medical): No     Lack of Transportation (Non-Medical): No   Stress: No Stress Concern Present (10/9/2023)    [employer] - Occupational Stress Questionnaire     Feeling of Stress : Not at all   Social Connections: Unknown (10/9/2023)    Social Connection and Isolation Panel [NHANES]     Frequency of Communication with Friends and Family: Once a week     Frequency of Social Gatherings with Friends and Family: Once a week     Marital Status: Never    Housing Stability: Unknown (10/9/2023)    Housing Stability Vital Sign     Unable to Pay for Housing in the Last Year: No     Unstable Housing in the Last Year: No       Plan:   Spoke with patient on the telephone and completed initial enrollment for ED Navigation.  Patient reported she was told to get an ultrasound and plans to go to the ED to get one.  Patient does not have insurance at this time.  She stated she had Medicaid but doesn't know if she still has coverage.  Patient stated she does not have a PCP or an Ob/Gyn.  I provided information on [facility] for free/reduced medical care.  Patient reported she receives food stamps but does not have enough food.  She stated she is pregnant with her fourth child and is not excited about it.  Patient denied any concerns with housing, utilities, or transportation.  Patient denied the use of cigarettes/alcohol, and she stated she does not have concerns with depression/anxiety.  Patient was given education on ([facility] at the Right [facility] information, [facility] information, and Heart healthy diet tips), [facility] Nurse Triage line, [facility] information, food pantries, Pregnancy Support center, and list of organizations that offer assistance  with daily living expenses.    Lisseth Hicks  ED Navigator

## 2023-10-10 NOTE — DISCHARGE INSTRUCTIONS

## 2023-10-10 NOTE — ED PROVIDER NOTES
Encounter Date: 10/9/2023       History     Chief Complaint   Patient presents with    Abdominal Pain     Abd pain and cramping since Saturday, pt states she was seen at [facility] yesterday and was recommended to go to [facility] for an ultrasound, pt is pregnant but unknown how many weeks     HPI  Patient is a  female who is presenting with lower abdominal pain that began Saturday which prompted her visit to an outside facility yesterday where she was found to be pregnant. Patient states her LMP was  but she reports her menstrual cycle have been shorter than usual over the last few months. At the OSH, the patient was found to have BV and was provided with prescription for metronidazole which she has not taken as of yet. Pt denies nausea and vomiting, and chest pain.     Review of patient's allergies indicates:  No Known Allergies  Past Medical History:   Diagnosis Date    Hard of hearing     Herpes genitalis in women      No past surgical history on file.  No family history on file.  Social History     Tobacco Use    Smoking status: Never    Smokeless tobacco: Never   Substance Use Topics    Alcohol use: No    Drug use: No     Review of Systems   Gastrointestinal:  Positive for abdominal pain.       Physical Exam     Initial Vitals [10/09/23 2051]   BP Pulse Resp Temp SpO2   110/71 73 16 98.2 °F (36.8 °C) 100 %      MAP       --         Physical Exam    Constitutional: She appears well-developed and well-nourished.   HENT:   Head: Normocephalic and atraumatic.   Right Ear: External ear normal.   Left Ear: External ear normal.   Eyes: Conjunctivae and EOM are normal.   Neck:   Normal range of motion.  Cardiovascular:  Normal rate, regular rhythm and normal heart sounds.           Pulmonary/Chest: Breath sounds normal.   Abdominal: Abdomen is soft. There is abdominal tenderness.   Bilateral lower abdominal tenderness.  Suprapubic tenderness   Musculoskeletal:         General: Normal range of motion.       Cervical back: Normal range of motion.     Skin: Skin is warm.         ED Course   Procedures  Labs Reviewed - No data to display       Imaging Results              US OB Transvaginal (Final result)  Result time 10/09/23 21:55:51      Final result by Lawrence Luu MD (10/09/23 21:55:51)                   Narrative:    INDICATION: abdominal pain    EXAMINATION: Ultrasound US PREGNANCY TRANSVAGINAL    TECHNIQUE: Transabdominal pelvic ultrasound was performed. Grayscale, spectral waveform, and color flow Doppler evaluation of the adnexa.    COMPARISON: None.  ____________________________________________    LMP: [2023  EGA: 1 weeks 3 days    FINDINGS:  UTERUS: Normal in size and position.  RIGHT OVARY: 3.4 x 1.9 x 2.9 cm. No adnexal mass. Normal flow is confirmed.  LEFT OVARY: Not visualized. No adnexal mass.  FREE FLUID:  None.    INTRAUTERINE GESTATIONAL SAC(s) (size/shape): Single. Been sac size is 1.6 cm.  YOLK SAC: Identified  FETAL POLE: Identified CRL 0.19 cm. out of range.  ESTIMATED GESTATION AGE: 5 weeks 6 days based on sac size.  FETAL HEART MOTION: No heart activity is identified.  PLACENTA: Not visualized due to fetal age.  SUBCHORIONIC HEMORRHAGE: None.  AMNIOTIC FLUID: Qualitatively normal.    IMPRESSION:    There is an early intrauterine gestational sac with probable fetal pole. Viability is not confirmed. Consider follow-up with serial beta hCG values or ultrasound.    Electronically signed by:  Lawrence Luu MD  10/09/2023 09:55 PM CDT Workstation: [id number]                                     Medications - No data to display  Medical Decision Making  Amount and/or Complexity of Data Reviewed  Radiology: ordered.    Patient is a 28-year-old  female who presented for transvaginal ultrasound at the finding out she was pregnant yesterday at an outside facility.  Upon arrival to ED, VSS and nontoxic appearing.  Physical exam notable for bilateral lower abdominal tenderness and suprapubic tenderness.   Initial differential included but not limited to round ligament pain, early pregnancy abdominal pain.  Workup included TVUS.  As patient was seen yesterday at an outside facility and had a speculum exam performed, the speculum exam was deferred in this visit.  Labs were also deferred as patient had labs performed yesterday.    HERNANDEZ Dee, PGY-3  Emergency Medicine      Please excuse any current medical areas as this was dictated using [employer] technology.             ED Course as of 10/09/23 2217   Mon Oct 09, 2023   2202 TVUS shows there is an early intrauterine gestational sac with probable fetal pole. Viability is not confirmed. Consider follow-up with serial beta hCG values or ultrasound. [BS]      ED Course User Index  [BS] Ivan Jones MD        PGY-3 Update:  Patient's ultrasound showed a gestational sac with probable fetal pole and an estimated gestational age of 5 weeks and 6 days.  Patient was provided with information for an OB gyn as she stated she is not been in the care of her Ob gyn for over a year.  Patient was informed of her results and counseled on medication therapy and demonstrated understanding.  Patient discharged with strict return precaution and recommendation to establish prenatal care.        Please excuse any grammatical errors of this is dictated using TimePoints technology             Clinical Impression:   Final diagnoses:  [Z34.91] First trimester pregnancy (Primary)        ED Disposition Condition    Discharge Stable          ED Prescriptions    None       Follow-up Information       Follow up With Specialties Details Why Contact Info    Ailyn Selby MD Obstetrics Call in 1 day To set up a follow-up appointment, To recheck today's symptoms [address] [facility]  [address]  [place] [place] [address]  [phone]      Ethan Castorena MD Obstetrics and Gynecology Call in 1 day To set up a follow-up appointment, To recheck today's symptoms and have repeat b-hcg [address] [facility]  [facility]  [address]  [place] [address]  [phone]               Krys Dee MD  Resident  10/09/23 2215

## 2023-10-11 LAB
C TRACH RRNA SPEC QL NAA+PROBE: NEGATIVE
N GONORRHOEA RRNA SPEC QL NAA+PROBE: NEGATIVE
N.GONORROHEAE, AMP RNA SOURCE: NORMAL
SPECIMEN SOURCE: NORMAL

## 2023-10-26 ENCOUNTER — HOSPITAL ENCOUNTER (EMERGENCY)
Facility: HOSPITAL | Age: 28
Discharge: HOME | End: 2023-10-27
Attending: EMERGENCY MEDICINE

## 2023-10-26 DIAGNOSIS — R11.2 NAUSEA AND VOMITING, UNSPECIFIED VOMITING TYPE: Primary | ICD-10-CM

## 2023-10-26 DIAGNOSIS — R52 PAIN: ICD-10-CM

## 2023-10-26 DIAGNOSIS — N30.00 ACUTE CYSTITIS WITHOUT HEMATURIA: ICD-10-CM

## 2023-10-26 LAB
ALBUMIN SERPL BCP-MCNC: 4.1 G/DL (ref 3.5–5.2)
ALP SERPL-CCNC: 35 U/L (ref 55–135)
ALT SERPL W/O P-5'-P-CCNC: 8 U/L (ref 10–44)
ANION GAP SERPL CALC-SCNC: 12 MMOL/L (ref 8–16)
AST SERPL-CCNC: 13 U/L (ref 10–40)
B-HCG UR QL: POSITIVE
BILIRUB SERPL-MCNC: 0.8 MG/DL (ref 0.1–1)
BUN SERPL-MCNC: 11 MG/DL (ref 6–20)
CALCIUM SERPL-MCNC: 9.1 MG/DL (ref 8.7–10.5)
CHLORIDE SERPL-SCNC: 102 MMOL/L (ref 95–110)
CO2 SERPL-SCNC: 20 MMOL/L (ref 23–29)
CREAT SERPL-MCNC: 0.8 MG/DL (ref 0.5–1.4)
CTP QC/QA: YES
EST. GFR  (NO RACE VARIABLE): >60 ML/MIN/1.73 M^2
GLUCOSE SERPL-MCNC: 100 MG/DL (ref 70–110)
INFLUENZA A, MOLECULAR: NEGATIVE
INFLUENZA B, MOLECULAR: NEGATIVE
LIPASE SERPL-CCNC: 11 U/L (ref 4–60)
POTASSIUM SERPL-SCNC: 3.6 MMOL/L (ref 3.5–5.1)
PROT SERPL-MCNC: 7.4 G/DL (ref 6–8.4)
SARS-COV-2 RDRP RESP QL NAA+PROBE: NEGATIVE
SODIUM SERPL-SCNC: 134 MMOL/L (ref 136–145)
SPECIMEN SOURCE: NORMAL

## 2023-10-26 PROCEDURE — 87502 INFLUENZA DNA AMP PROBE: CPT | Performed by: STUDENT IN AN ORGANIZED HEALTH CARE EDUCATION/TRAINING PROGRAM

## 2023-10-26 PROCEDURE — 96374 THER/PROPH/DIAG INJ IV PUSH: CPT

## 2023-10-26 PROCEDURE — U0002 COVID-19 LAB TEST NON-CDC: HCPCS | Performed by: STUDENT IN AN ORGANIZED HEALTH CARE EDUCATION/TRAINING PROGRAM

## 2023-10-26 PROCEDURE — 81025 URINE PREGNANCY TEST: CPT | Performed by: STUDENT IN AN ORGANIZED HEALTH CARE EDUCATION/TRAINING PROGRAM

## 2023-10-26 PROCEDURE — 84702 CHORIONIC GONADOTROPIN TEST: CPT | Performed by: STUDENT IN AN ORGANIZED HEALTH CARE EDUCATION/TRAINING PROGRAM

## 2023-10-26 PROCEDURE — 87147 CULTURE TYPE IMMUNOLOGIC: CPT | Mod: 59 | Performed by: STUDENT IN AN ORGANIZED HEALTH CARE EDUCATION/TRAINING PROGRAM

## 2023-10-26 PROCEDURE — 86900 BLOOD TYPING SEROLOGIC ABO: CPT | Performed by: STUDENT IN AN ORGANIZED HEALTH CARE EDUCATION/TRAINING PROGRAM

## 2023-10-26 PROCEDURE — 85025 COMPLETE CBC W/AUTO DIFF WBC: CPT | Performed by: STUDENT IN AN ORGANIZED HEALTH CARE EDUCATION/TRAINING PROGRAM

## 2023-10-26 PROCEDURE — 80053 COMPREHEN METABOLIC PANEL: CPT | Performed by: STUDENT IN AN ORGANIZED HEALTH CARE EDUCATION/TRAINING PROGRAM

## 2023-10-26 PROCEDURE — 99284 EMERGENCY DEPT VISIT MOD MDM: CPT | Mod: 25

## 2023-10-26 PROCEDURE — 87086 URINE CULTURE/COLONY COUNT: CPT | Performed by: STUDENT IN AN ORGANIZED HEALTH CARE EDUCATION/TRAINING PROGRAM

## 2023-10-26 PROCEDURE — 63600175 PHARM REV CODE 636 W HCPCS: Performed by: STUDENT IN AN ORGANIZED HEALTH CARE EDUCATION/TRAINING PROGRAM

## 2023-10-26 PROCEDURE — 81001 URINALYSIS AUTO W/SCOPE: CPT | Performed by: STUDENT IN AN ORGANIZED HEALTH CARE EDUCATION/TRAINING PROGRAM

## 2023-10-26 PROCEDURE — 83690 ASSAY OF LIPASE: CPT | Performed by: STUDENT IN AN ORGANIZED HEALTH CARE EDUCATION/TRAINING PROGRAM

## 2023-10-26 RX ORDER — METOCLOPRAMIDE HYDROCHLORIDE 5 MG/ML
10 INJECTION INTRAMUSCULAR; INTRAVENOUS
Status: COMPLETED | OUTPATIENT
Start: 2023-10-26 | End: 2023-10-26

## 2023-10-26 RX ADMIN — METOCLOPRAMIDE 10 MG: 5 INJECTION, SOLUTION INTRAMUSCULAR; INTRAVENOUS at 11:10

## 2023-10-27 VITALS
BODY MASS INDEX: 21.22 KG/M2 | HEART RATE: 97 BPM | SYSTOLIC BLOOD PRESSURE: 113 MMHG | TEMPERATURE: 99 F | WEIGHT: 140 LBS | DIASTOLIC BLOOD PRESSURE: 59 MMHG | RESPIRATION RATE: 21 BRPM | OXYGEN SATURATION: 99 % | HEIGHT: 68 IN

## 2023-10-27 LAB
ABO + RH BLD: NORMAL
BACTERIA #/AREA URNS HPF: ABNORMAL /HPF
BASOPHILS # BLD AUTO: 0.04 K/UL (ref 0–0.2)
BASOPHILS NFR BLD: 0.3 % (ref 0–1.9)
BILIRUB UR QL STRIP: NEGATIVE
CLARITY UR: ABNORMAL
COLOR UR: YELLOW
DIFFERENTIAL METHOD: ABNORMAL
EOSINOPHIL # BLD AUTO: 0 K/UL (ref 0–0.5)
EOSINOPHIL NFR BLD: 0 % (ref 0–8)
ERYTHROCYTE [DISTWIDTH] IN BLOOD BY AUTOMATED COUNT: 14.1 % (ref 11.5–14.5)
GLUCOSE UR QL STRIP: NEGATIVE
HCG INTACT+B SERPL-ACNC: NORMAL MIU/ML
HCT VFR BLD AUTO: 38.6 % (ref 37–48.5)
HGB BLD-MCNC: 13.4 G/DL (ref 12–16)
HGB UR QL STRIP: NEGATIVE
HYALINE CASTS #/AREA URNS LPF: 38 /LPF
IMM GRANULOCYTES # BLD AUTO: 0.05 K/UL (ref 0–0.04)
IMM GRANULOCYTES NFR BLD AUTO: 0.4 % (ref 0–0.5)
KETONES UR QL STRIP: ABNORMAL
LEUKOCYTE ESTERASE UR QL STRIP: ABNORMAL
LYMPHOCYTES # BLD AUTO: 0.3 K/UL (ref 1–4.8)
LYMPHOCYTES NFR BLD: 2.5 % (ref 18–48)
MCH RBC QN AUTO: 30 PG (ref 27–31)
MCHC RBC AUTO-ENTMCNC: 34.7 G/DL (ref 32–36)
MCV RBC AUTO: 87 FL (ref 82–98)
MICROSCOPIC COMMENT: ABNORMAL
MONOCYTES # BLD AUTO: 0.5 K/UL (ref 0.3–1)
MONOCYTES NFR BLD: 3.4 % (ref 4–15)
NEUTROPHILS # BLD AUTO: 12.8 K/UL (ref 1.8–7.7)
NEUTROPHILS NFR BLD: 93.4 % (ref 38–73)
NITRITE UR QL STRIP: NEGATIVE
NRBC BLD-RTO: 0 /100 WBC
PH UR STRIP: 7 [PH] (ref 5–8)
PLATELET # BLD AUTO: 208 K/UL (ref 150–450)
PMV BLD AUTO: 9.9 FL (ref 9.2–12.9)
PROT UR QL STRIP: ABNORMAL
RBC # BLD AUTO: 4.46 M/UL (ref 4–5.4)
RBC #/AREA URNS HPF: 1 /HPF (ref 0–4)
SP GR UR STRIP: 1.03 (ref 1–1.03)
SQUAMOUS #/AREA URNS HPF: 5 /HPF
URN SPEC COLLECT METH UR: ABNORMAL
UROBILINOGEN UR STRIP-ACNC: NEGATIVE EU/DL
WBC # BLD AUTO: 13.7 K/UL (ref 3.9–12.7)
WBC #/AREA URNS HPF: 32 /HPF (ref 0–5)

## 2023-10-27 RX ORDER — ONDANSETRON 4 MG/1
4 TABLET, ORALLY DISINTEGRATING ORAL EVERY 8 HOURS PRN
Qty: 12 TABLET | Refills: 0 | Status: SHIPPED | OUTPATIENT
Start: 2023-10-27

## 2023-10-27 RX ORDER — CEFUROXIME AXETIL 500 MG/1
500 TABLET ORAL EVERY 12 HOURS
Qty: 14 TABLET | Refills: 0 | Status: SHIPPED | OUTPATIENT
Start: 2023-10-27 | End: 2023-11-03

## 2023-10-27 NOTE — DISCHARGE INSTRUCTIONS
Return to the ER for uncontrolled vomiting, worsening abdominal pain, fever, or for any other concerns.

## 2023-10-27 NOTE — ED PROVIDER NOTES
Encounter Date: 10/26/2023       History     Chief Complaint   Patient presents with    Abdominal Pain     Patient states that she is 9 weeks pregnant. She c/o of lower abdominal/pelvic pain along with nausea and vomiting that began today.    Vomiting     28-year-old female with early pregnancy, complaining of vomiting and suprapubic abdominal pain.  She says these symptoms began this morning.  She is vomited 4 times since this morning and has associated lower abdominal cramping.  She denies vaginal bleeding or dysuria or other acute symptoms.    The history is provided by the patient.     Review of patient's allergies indicates:  No Known Allergies  Past Medical History:   Diagnosis Date    Hard of hearing     Herpes genitalis in women      No past surgical history on file.  No family history on file.  Social History     Tobacco Use    Smoking status: Never    Smokeless tobacco: Never   Substance Use Topics    Alcohol use: No    Drug use: No     Review of Systems   Gastrointestinal:  Positive for abdominal pain and vomiting.   All other systems reviewed and are negative.      Physical Exam     Initial Vitals [10/26/23 2155]   BP Pulse Resp Temp SpO2   (!) 118/59 100 18 99.4 °F (37.4 °C) 100 %      MAP       --         Physical Exam    Nursing note and vitals reviewed.  Constitutional: She appears well-developed and well-nourished. She is not diaphoretic. No distress.   HENT:   Head: Normocephalic.   Eyes: Conjunctivae are normal.   Neck: Neck supple.   Normal range of motion.  Cardiovascular:  Normal rate.           Pulmonary/Chest: No respiratory distress.   Abdominal: Abdomen is soft. She exhibits no distension. There is abdominal tenderness.   Suprapubic tenderness, no rebound or guarding   Musculoskeletal:         General: No edema.      Cervical back: Normal range of motion and neck supple.     Neurological: She is alert. She has normal strength. GCS eye subscore is 4. GCS verbal subscore is 5. GCS motor  subscore is 6.   Skin: Skin is warm and dry.   Psychiatric: She has a normal mood and affect.         ED Course   Procedures  Labs Reviewed   CBC W/ AUTO DIFFERENTIAL - Abnormal; Notable for the following components:       Result Value    WBC 13.70 (*)     Gran # (ANC) 12.8 (*)     Immature Grans (Abs) 0.05 (*)     Lymph # 0.3 (*)     Gran % 93.4 (*)     Lymph % 2.5 (*)     Mono % 3.4 (*)     All other components within normal limits    Narrative:     Release to patient->Immediate   COMPREHENSIVE METABOLIC PANEL - Abnormal; Notable for the following components:    Sodium 134 (*)     CO2 20 (*)     Alkaline Phosphatase 35 (*)     ALT 8 (*)     All other components within normal limits    Narrative:     Release to patient->Immediate   URINALYSIS, REFLEX TO URINE CULTURE - Abnormal; Notable for the following components:    Appearance, UA Hazy (*)     Protein, UA 1+ (*)     Ketones, UA 3+ (*)     Leukocytes, UA 1+ (*)     All other components within normal limits    Narrative:     Specimen Source->Urine   URINALYSIS MICROSCOPIC - Abnormal; Notable for the following components:    WBC, UA 32 (*)     Hyaline Casts, UA 38 (*)     All other components within normal limits    Narrative:     Specimen Source->Urine   POCT URINE PREGNANCY - Abnormal; Notable for the following components:    POC Preg Test, Ur Positive (*)     All other components within normal limits   CULTURE, URINE   LIPASE    Narrative:     Release to patient->Immediate   HCG, QUANTITATIVE    Narrative:     Release to patient->Immediate   SARS-COV-2 RNA AMPLIFICATION, QUAL   INFLUENZA A AND B ANTIGEN    Narrative:     Specimen Source->Nasopharyngeal Swab   GROUP & RH          Imaging Results              US OB <14 Wks, TransAbd, Single Gestation (Final result)  Result time 10/26/23 22:54:56      Final result by Michael Botello MD (10/26/23 22:54:56)                   Narrative:    US PREGNANCY LIMITED    [id number] years Female with  COMPARISON:  None.    TECHNIQUE: Grayscale, color Doppler, and spectral Doppler ultrasound images of the pelvis were obtained.    FINDINGS:    [The uterus is 10.5 x 6.8 x 8 cm. An intrauterine gestational sac is seen with a fetal pole and yolk sac present. Fetal pole is 1.96 cm correlating to an AP portable a gestational age. A normal yolk sac is seen. A lentiform hypoechoic collection adjacent to the chorionic plate may represent a subchorionic hemorrhage. This effaces less than 20% of the gestational sac.  Fetal cardiac activity is 1 94 bpm    The right ovary is 2.5 x 2.2 x 1.9 cm. The left ovary 1.9 x 1.6 x 1.7 cm. Both ovaries appear normal normal blood flow is present in the ovaries.  IMPRESSION:    1.  8 week 4 day SIUP with cardiac activity demonstrated.  2.  Normal-appearing gestational sac, fetal pole and yolk sac.  3.  There is a small subchorionic hemorrhage observed.    Electronically signed by:  Michael Botello MD  10/26/2023 10:54 PM CDT Workstation: [id number]                                     Medications   metoclopramide HCl injection 10 mg (10 mg Intravenous Given 10/26/23 7046)     Medical Decision Making  Abdominal exam is benign.  She is leukocytosis of 13.  Serum chemistries unremarkable.  Pelvic ultrasound shows live IUP.  She has a UTI.  She received IV Zofran and fluids and feels better.  Will discharge with Zofran, cefuroxime, return precautions.    Risk  Prescription drug management.                               Clinical Impression:   Final diagnoses:  [R52] Pain  [R11.2] Nausea and vomiting, unspecified vomiting type (Primary)  [N30.00] Acute cystitis without hematuria        ED Disposition Condition    Discharge Stable          ED Prescriptions       Medication Sig Dispense Start Date End Date Auth. Provider    cefUROXime (CEFTIN) 500 MG tablet Take 1 tablet (500 mg total) by mouth every 12 (twelve) hours. for 7 days 14 tablet 10/27/2023 11/3/2023 Ivan Jacques MD    ondansetron (ZOFRAN-ODT) 4 MG  TbDL Take 1 tablet (4 mg total) by mouth every 8 (eight) hours as needed (nausea). 12 tablet 10/27/2023 -- Ivan Jacques MD          Follow-up Information       Follow up With Specialties Details Why Contact Info    Your OB                 Ivan Jacques MD  10/27/23 0664

## 2023-10-27 NOTE — FIRST PROVIDER EVALUATION
"Medical screening examination initiated.  I have conducted a focused provider triage encounter, findings are as follows:    Brief history of present illness:  27 yo F pregnant EGA 9 weeks with low abdominal pain, n/v    Vitals:    10/26/23 2155   BP: (!) 118/59   BP Location: Left arm   Patient Position: Sitting   Pulse: 100   Resp: 18   Temp: 99.4 °F (37.4 °C)   TempSrc: Oral   SpO2: 100%   Weight: 63.5 kg (140 lb)   Height: 5' 8" (1.727 m)       Pertinent physical exam:  nontoxic, afebrile but actively vomiting    Brief workup plan:  labs, us    Preliminary workup initiated; this workup will be continued and followed by the physician or advanced practice provider that is assigned to the patient when roomed.  "

## 2023-10-28 LAB — BACTERIA UR CULT: ABNORMAL

## 2023-11-08 ENCOUNTER — PATIENT OUTREACH (OUTPATIENT)
Dept: EMERGENCY MEDICINE | Facility: HOSPITAL | Age: 28
End: 2023-11-08

## 2023-12-30 ENCOUNTER — HOSPITAL ENCOUNTER (EMERGENCY)
Facility: HOSPITAL | Age: 28
Discharge: HOME | End: 2023-12-30
Attending: EMERGENCY MEDICINE
Payer: MEDICAID

## 2023-12-30 VITALS
SYSTOLIC BLOOD PRESSURE: 126 MMHG | DIASTOLIC BLOOD PRESSURE: 61 MMHG | BODY MASS INDEX: 23.13 KG/M2 | TEMPERATURE: 98 F | RESPIRATION RATE: 18 BRPM | OXYGEN SATURATION: 99 % | WEIGHT: 152.13 LBS | HEART RATE: 91 BPM

## 2023-12-30 DIAGNOSIS — J10.1 INFLUENZA B: Primary | ICD-10-CM

## 2023-12-30 LAB
INFLUENZA A, MOLECULAR: NEGATIVE
INFLUENZA B, MOLECULAR: POSITIVE
SARS-COV-2 RDRP RESP QL NAA+PROBE: NEGATIVE
SPECIMEN SOURCE: ABNORMAL

## 2023-12-30 PROCEDURE — 87502 INFLUENZA DNA AMP PROBE: CPT | Performed by: PHYSICIAN ASSISTANT

## 2023-12-30 PROCEDURE — 99284 EMERGENCY DEPT VISIT MOD MDM: CPT

## 2023-12-30 PROCEDURE — U0002 COVID-19 LAB TEST NON-CDC: HCPCS | Performed by: PHYSICIAN ASSISTANT

## 2023-12-30 RX ORDER — OSELTAMIVIR PHOSPHATE 75 MG/1
75 CAPSULE ORAL 2 TIMES DAILY
Qty: 10 CAPSULE | Refills: 0 | Status: SHIPPED | OUTPATIENT
Start: 2023-12-30 | End: 2024-01-04

## 2023-12-31 NOTE — FIRST PROVIDER EVALUATION
Emergency Department TeleTriage Encounter Note      CHIEF COMPLAINT    Chief Complaint   Patient presents with    flu like symptoms     Cough, congestion, body aches x5 days. Pt is 17 weeks pregnant.       VITAL SIGNS   Initial Vitals [12/30/23 1745]   BP Pulse Resp Temp SpO2   126/61 91 18 98.3 °F (36.8 °C) 99 %      MAP       --            ALLERGIES    Review of patient's allergies indicates:  No Known Allergies    PROVIDER TRIAGE NOTE    28 year old 17 weeks presents with uri symptoms for 5 days. Otc meds not working for her.   No vaginal bleeding.   Mild abdominal discomfort     PE:  Patient alert and oriented. No acute distress    Initial orders will be placed and care will be transferred to an alternate provider when patient is roomed for a full evaluation. Any additional orders and the final disposition will be determined by that provider.        ORDERS  Labs Reviewed   INFLUENZA A & B BY MOLECULAR   SARS-COV-2 RNA AMPLIFICATION, QUAL       ED Orders (720h ago, onward)      Start Ordered     Status Ordering Provider    12/30/23 1801 12/30/23 1800  COVID-19 Rapid Screening  STAT         Ordered MOOSE BARNETT    12/30/23 1801 12/30/23 1800  Influenza A & B by Molecular  STAT         Ordered MOOSE BARNETT    12/30/23 1800 12/30/23 1800  Assess fetal heart tones  Until discontinued         Ordered MOOSE BARNETT              Virtual Visit Note: The provider triage portion of this emergency department evaluation and documentation was performed via [employer], a HIPAA-compliant telemedicine application, in concert with a tele-presenter in the room. A face to face patient evaluation with one of my colleagues will occur once the patient is placed in an emergency department room.      DISCLAIMER: This note was prepared with [employer] voice recognition transcription software. Garbled syntax, mangled pronouns, and other bizarre constructions may be attributed to that software system.

## 2023-12-31 NOTE — ED PROVIDER NOTES
Encounter Date: 12/30/2023       History     Chief Complaint   Patient presents with    flu like symptoms     Cough, congestion, body aches x5 days. Pt is 17 weeks pregnant.     Patient is a 28 year old female who presents with cough, congestion for 3 days.  She is currently 17 weeks pregnant.  She denied past medical history.  She has been using a nasal spray and Tylenol as needed.  She denies nausea, vomiting, abdominal pain, vaginal discharge or urinary symptoms.  She reports her significant other just got out of custodial and is also sick with symptoms. She denied fever.    The history is provided by the patient.     Review of patient's allergies indicates:  No Known Allergies  Past Medical History:   Diagnosis Date    Hard of hearing     Herpes genitalis in women      No past surgical history on file.  No family history on file.  Social History     Tobacco Use    Smoking status: Never    Smokeless tobacco: Never   Substance Use Topics    Alcohol use: No    Drug use: No     Review of Systems   Constitutional:  Negative for activity change, appetite change, fatigue and fever.   HENT:  Positive for congestion and rhinorrhea. Negative for sore throat.    Eyes:  Negative for visual disturbance.   Respiratory:  Positive for cough. Negative for chest tightness, shortness of breath and wheezing.    Cardiovascular:  Negative for chest pain and palpitations.   Gastrointestinal:  Negative for diarrhea, nausea and vomiting.   Musculoskeletal:  Negative for arthralgias and myalgias.   Skin:  Negative for rash.   Neurological:  Negative for weakness, light-headedness, numbness and headaches.       Physical Exam     Initial Vitals [12/30/23 1745]   BP Pulse Resp Temp SpO2   126/61 91 18 98.3 °F (36.8 °C) 99 %      MAP       --         Physical Exam    Nursing note and vitals reviewed.  Constitutional: Vital signs are normal. She appears well-developed and well-nourished. She is cooperative.  Non-toxic appearance. She does not have  a sickly appearance.   HENT:   Head: Normocephalic and atraumatic.   Right Ear: Tympanic membrane and external ear normal.   Left Ear: Tympanic membrane and external ear normal.   Nose: Nose normal.   Mouth/Throat: Uvula is midline and oropharynx is clear and moist.   Eyes: Conjunctivae and lids are normal.   Neck: Neck supple.   Normal range of motion.   Full passive range of motion without pain.     Cardiovascular:  Normal rate, regular rhythm and normal heart sounds.     Exam reveals no gallop and no friction rub.       No murmur heard.  Pulmonary/Chest: Breath sounds normal. She has no wheezes. She has no rhonchi. She has no rales.   Abdominal: Abdomen is soft. There is no abdominal tenderness.   Gravid abdomen. Non-tender.    Musculoskeletal:      Cervical back: Full passive range of motion without pain, normal range of motion and neck supple.     Neurological: She is alert.   Skin: Skin is warm, dry and intact. No rash noted.         ED Course   Procedures  Labs Reviewed   INFLUENZA A & B BY MOLECULAR - Abnormal; Notable for the following components:       Result Value    Influenza B, Molecular Positive (*)     All other components within normal limits    Narrative:     Influenza B critical result(s) called and verbal readback obtained   from Olvin Sultana RN.  by EBONI 12/30/2023 19:27   SARS-COV-2 RNA AMPLIFICATION, QUAL          Imaging Results    None          Medications - No data to display  Medical Decision Making  Emergent evaluation of a 28-year-old female who presents with congestion and cough for 3 days.  Significant other with similar symptoms.  Denies fevers.  Denies abdominal pain, urinary symptoms, nausea, vomiting, vaginal discharge or abnormal vaginal bleeding.  She has a soft, nontender, gravid abdomen.  Fetal heart tones noted.. Vital signs are stable. Clear and equal breath sounds bilaterally. Oxygen saturation is stable. I doubt pneumonia. No sign of otitis media. Denied GI complaints.  Patient is noted to be influenza B positive.  Will treat with Tamiflu.  Discussed results with patient. Return precautions given. Based on my clinical evaluation, I do not appreciate any immediate, emergent, or life threatening condition or etiology that warrants additional workup today and feel that the patient can be discharged with close follow up care.  Patient is to follow up with their primary care provider. All questions answered.        Amount and/or Complexity of Data Reviewed  External Data Reviewed: labs, radiology and notes.  Labs: ordered.    Risk  OTC drugs.  Prescription drug management.                                      Clinical Impression:  Final diagnoses:  [J10.1] Influenza B (Primary)          ED Disposition Condition    Discharge Stable          ED Prescriptions       Medication Sig Dispense Start Date End Date Auth. Provider    oseltamivir (TAMIFLU) 75 MG capsule Take 1 capsule (75 mg total) by mouth 2 (two) times daily. for 5 days 10 capsule 12/30/2023 1/4/2024 Clotilde Birmingham PA-C          Follow-up Information       Follow up With Specialties Details Why Contact Info Additional Information    Lyla Klein NP Internal Medicine   [address] [facility] [address] [phone]       [facility] Emergency Medicine  As needed [address] [place] [address] [address] [place] [address] 1st floor             Clotilde Birmingham PA-C  12/30/23 2000

## 2023-12-31 NOTE — DISCHARGE INSTRUCTIONS
Continue your over the counter medications. Take tylenol as needed. Return for any new or worsening symptoms.

## 2024-01-19 ENCOUNTER — PATIENT OUTREACH (OUTPATIENT)
Dept: EMERGENCY MEDICINE | Facility: HOSPITAL | Age: 29
End: 2024-01-19

## 2024-03-06 ENCOUNTER — PATIENT OUTREACH (OUTPATIENT)
Dept: EMERGENCY MEDICINE | Facility: HOSPITAL | Age: 29
End: 2024-03-06

## 2024-04-03 ENCOUNTER — HOSPITAL ENCOUNTER (OUTPATIENT)
Facility: HOSPITAL | Age: 29
Discharge: HOME | End: 2024-04-03
Attending: GENERAL PRACTICE | Admitting: GENERAL PRACTICE
Payer: MEDICAID

## 2024-04-03 VITALS
HEART RATE: 103 BPM | BODY MASS INDEX: 27.16 KG/M2 | OXYGEN SATURATION: 98 % | RESPIRATION RATE: 16 BRPM | WEIGHT: 169 LBS | SYSTOLIC BLOOD PRESSURE: 125 MMHG | DIASTOLIC BLOOD PRESSURE: 82 MMHG | HEIGHT: 66 IN | TEMPERATURE: 99 F

## 2024-04-03 DIAGNOSIS — R10.9 ABDOMINAL PAIN: ICD-10-CM

## 2024-04-03 LAB
BACTERIA #/AREA URNS HPF: NORMAL /HPF
BILIRUB UR QL STRIP: NEGATIVE
CLARITY UR: CLEAR
COLOR UR: COLORLESS
GLUCOSE UR QL STRIP: NEGATIVE
HGB UR QL STRIP: NEGATIVE
KETONES UR QL STRIP: NEGATIVE
LEUKOCYTE ESTERASE UR QL STRIP: ABNORMAL
MICROSCOPIC COMMENT: NORMAL
NITRITE UR QL STRIP: NEGATIVE
PH UR STRIP: 6 [PH] (ref 5–8)
PROT UR QL STRIP: NEGATIVE
SP GR UR STRIP: <1.005 (ref 1–1.03)
SQUAMOUS #/AREA URNS HPF: 1 /HPF
URN SPEC COLLECT METH UR: ABNORMAL
UROBILINOGEN UR STRIP-ACNC: NEGATIVE EU/DL
WBC #/AREA URNS HPF: 0 /HPF (ref 0–5)

## 2024-04-03 PROCEDURE — 59025 FETAL NON-STRESS TEST: CPT

## 2024-04-03 PROCEDURE — 81001 URINALYSIS AUTO W/SCOPE: CPT | Performed by: GENERAL PRACTICE

## 2024-04-03 RX ORDER — ONDANSETRON 4 MG/1
8 TABLET, ORALLY DISINTEGRATING ORAL EVERY 8 HOURS PRN
Status: DISCONTINUED | OUTPATIENT
Start: 2024-04-03 | End: 2024-04-03 | Stop reason: HOSPADM

## 2024-04-04 NOTE — NURSING
[facility]  Department of Obstetrics and Gynecology  Labor & Delivery Triage Assessment    PATIENT NAME: Kayla Marie Schoenberger  MRN: [id number]  TODAY'S DATE: 2024    CHIEF COMPLAINT: Abdominal Pain (Low abd pain x 2 days)      OB History    Para Term  AB Living   4 3 3 0 0 3   SAB IAB Ectopic Multiple Live Births   0 0 0 0 3      # Outcome Date GA Lbr Manpreet/2nd Weight Sex Delivery Anes PTL Lv   4 Current            3 Term     F CS-LTranv   MEHREEN   2 Term 20 39w6d 00:28 / 00:07 3.395 kg (7 lb 7.8 oz) F Vag-Spont None N MEHREEN      Name: SCHOENBERGER,GIRL KAYLA      Apgar1: 8  Apgar5: 8   1 Term    3.912 kg (8 lb 10 oz) F Vag-Spont  N MEHREEN     Past Medical History:   Diagnosis Date    Hard of hearing     Herpes genitalis in women     Scarlet fever      Past Surgical History:   Procedure Laterality Date     SECTION           VITAL SIGNS - ABNORMAL VITALS INCLUDE TEMP >100.4,RR <12 or >26, SUSTAINED MATERNAL PULSE <60 or >120     VITAL SIGNS (Most Recent)  Temp: 98.7 °F (37.1 °C) (24)  Pulse: 103 (24)  Resp: 16 (24)  BP: 125/82 (24)  SpO2: 98 % (24)    VITAL SIGNS     normal  HEADACHE    no     VOMITING    no  VISUAL DISTURBANCES  no  EPIGASTRIC PAIN        no  PROTEINURIA 2+ or MORE             no   EDEMA FACE/EXTREMITIES            no    FETAL MOVEMENT     FETAL MOVEMENT: Active  FETAL HEART RATE BASELINE =  130  normal  FETAL HEART RATE VARIABILITY:  Moderate  FETAL HEART RATE ACCELERATIONS FOR GESTATIONAL AGE: present  FETAL HEART RATE DECELERATIONS: none    ABDOMINAL PAIN/CRAMPING/CONTRACTIONS     Patient is complaining of abdominal pain/cramping/contractions. +abd pain - contractions na Resting tone is relaxed. Abdominal palpation is non-tender.    RUPTURE OF MEMBRANES OR LEAKING OF AMNIOTIC FLUID     Patient denies ROM or leaking of amniotic fluid.    VAGINAL BLEEDING     Patient denies vaginal  bleeding.    VAGINAL EXAM         VAGINAL EXAM DEFERRED DUE TO:  Not in Labor    PAIN PRESENT ON ARRIVAL     ONSET:   X2 days intermittent   LOCATION:  Lower abd/ pelvic  PAIN SCALE (0-10):  0  DESCRIPTION: pressure    Interventions     None.      PATIENT DISPOSITION     Discharged Home    1909 urine sent to lab  1923 MD updated on patient  1929 pt take off monitor and vs per MD; awaiting urine  1930 pt educated on plan of care   2026 Lab called for no urine results  2028 urine results sent to MD; pt updated  2036 pt dc to home verbalized understanding of dc instructions; f/u appts; no rx given. Ambulatory at dc. No acute distress noted.       Dr. Layton notified at 1923 of the above assessment.    Suze Chavez, RN  [facility]  04/03/2024

## 2024-04-04 NOTE — DISCHARGE INSTRUCTIONS
Call MD tomorrow for follow up.     May Take over the counter meds for diarrhea as needed.     Call your Doctor if you have any of the following:  Temperature above 100 degrees  Nausea, vomiting and/or diarrhea  Severe headache, dizziness, or blurred vision  Notable increase in swelling of hands or feet  Notable swelling of face and lips  Difficulty, pain or burning with urination  Foul smelling vaginal discharge  Decreased fetal movement    Come to the hospital if you have any of the following symptoms:  Your water breaks  More than 4-6 contractions in 1 hour for 2 or more hours  Vaginal bleeding like a period    After 28 weeks, you should feel 10 distinct fetal movements within a 2 hour period.    It is recommended that you drink 1/2 a gallon of water each day.  Tea, Soda and Juice are  in addition to this.    Labor and Delivery Phone number: [phone]    If you need to be seen on Labor and delivery between the hours of 6pm and 7am, please enter through the Emergency room entrance.

## 2024-04-06 ENCOUNTER — HOSPITAL ENCOUNTER (OUTPATIENT)
Facility: HOSPITAL | Age: 29
Discharge: HOME | End: 2024-04-06
Attending: STUDENT IN AN ORGANIZED HEALTH CARE EDUCATION/TRAINING PROGRAM
Payer: MEDICAID

## 2024-04-06 VITALS
HEIGHT: 66 IN | RESPIRATION RATE: 18 BRPM | TEMPERATURE: 98 F | OXYGEN SATURATION: 99 % | BODY MASS INDEX: 27.97 KG/M2 | WEIGHT: 174 LBS | HEART RATE: 67 BPM | SYSTOLIC BLOOD PRESSURE: 116 MMHG | DIASTOLIC BLOOD PRESSURE: 57 MMHG

## 2024-04-06 DIAGNOSIS — R10.9 ABDOMINAL PAIN: ICD-10-CM

## 2024-04-06 LAB
AMPHET+METHAMPHET UR QL: NEGATIVE
BACTERIA #/AREA URNS HPF: ABNORMAL /HPF
BARBITURATES UR QL SCN>200 NG/ML: NEGATIVE
BENZODIAZ UR QL SCN>200 NG/ML: NEGATIVE
BILIRUB UR QL STRIP: NEGATIVE
BUPRENORPHINE UR QL: NEGATIVE
BZE UR QL SCN: NEGATIVE
CANNABINOIDS UR QL SCN: NEGATIVE
CLARITY UR: ABNORMAL
COLOR UR: YELLOW
CREAT UR-MCNC: 174.3 MG/DL (ref 15–325)
CREAT UR-MCNC: 174.3 MG/DL (ref 15–325)
FIBRONECTIN FETAL SPEC QL: NEGATIVE
GLUCOSE UR QL STRIP: NEGATIVE
HGB UR QL STRIP: NEGATIVE
HYALINE CASTS #/AREA URNS LPF: 15 /LPF
KETONES UR QL STRIP: NEGATIVE
LEUKOCYTE ESTERASE UR QL STRIP: ABNORMAL
MICROSCOPIC COMMENT: ABNORMAL
NITRITE UR QL STRIP: NEGATIVE
OPIATES UR QL SCN: NEGATIVE
PCP UR QL SCN>25 NG/ML: NEGATIVE
PH UR STRIP: 7 [PH] (ref 5–8)
PROT UR QL STRIP: ABNORMAL
RBC #/AREA URNS HPF: 1 /HPF (ref 0–4)
SP GR UR STRIP: 1.02 (ref 1–1.03)
SQUAMOUS #/AREA URNS HPF: 5 /HPF
TOXICOLOGY INFORMATION: NORMAL
URN SPEC COLLECT METH UR: ABNORMAL
UROBILINOGEN UR STRIP-ACNC: NEGATIVE EU/DL
WBC #/AREA URNS HPF: 2 /HPF (ref 0–5)

## 2024-04-06 PROCEDURE — 80348 DRUG SCREENING BUPRENORPHINE: CPT | Performed by: STUDENT IN AN ORGANIZED HEALTH CARE EDUCATION/TRAINING PROGRAM

## 2024-04-06 PROCEDURE — 80307 DRUG TEST PRSMV CHEM ANLYZR: CPT | Performed by: STUDENT IN AN ORGANIZED HEALTH CARE EDUCATION/TRAINING PROGRAM

## 2024-04-06 PROCEDURE — 82731 ASSAY OF FETAL FIBRONECTIN: CPT | Performed by: STUDENT IN AN ORGANIZED HEALTH CARE EDUCATION/TRAINING PROGRAM

## 2024-04-06 PROCEDURE — 81001 URINALYSIS AUTO W/SCOPE: CPT | Mod: XB | Performed by: STUDENT IN AN ORGANIZED HEALTH CARE EDUCATION/TRAINING PROGRAM

## 2024-04-06 NOTE — NURSING
Former[facility]  Department of Obstetrics and Gynecology  Labor & Delivery Triage Assessment    PATIENT NAME: Kayla Marie Schoenberger  MRN: [id number]  TODAY'S DATE: 2024    CHIEF COMPLAINT: Abdominal Pain      OB History    Para Term  AB Living   4 3 3 0 0 3   SAB IAB Ectopic Multiple Live Births   0 0 0 0 3      # Outcome Date GA Lbr Manpreet/2nd Weight Sex Delivery Anes PTL Lv   4 Current            3 Term     F CS-LTranv   MEHREEN   2 Term 20 39w6d 00:28 / 00:07 3.395 kg (7 lb 7.8 oz) F Vag-Spont None N MEHREEN      Name: SCHOENBERGER,GIRL JESSICA      Apgar1: 8  Apgar5: 8   1 Term    3.912 kg (8 lb 10 oz) F Vag-Spont  N MEHREEN     Past Medical History:   Diagnosis Date    Hard of hearing     Herpes genitalis in women     Scarlet fever      Past Surgical History:   Procedure Laterality Date     SECTION           VITAL SIGNS - ABNORMAL VITALS INCLUDE TEMP >100.4,RR <12 or >26, SUSTAINED MATERNAL PULSE <60 or >120     VITAL SIGNS (Most Recent)  Temp: 98.2 °F (36.8 °C) (24 1503)  Pulse: 83 (24 1610)  Resp: 17 (24 1503)  BP: 115/71 (24 1503)  SpO2: 100 % (24 1610)    VITAL SIGNS     normal  HEADACHE    no     VOMITING    no  VISUAL DISTURBANCES  no  EPIGASTRIC PAIN        no  PROTEINURIA 2+ or MORE             no   EDEMA FACE/EXTREMITIES            no    FETAL MOVEMENT     FETAL MOVEMENT: Active  FETAL HEART RATE BASELINE =  140  normal  FETAL HEART RATE VARIABILITY:  Moderate  FETAL HEART RATE ACCELERATIONS FOR GESTATIONAL AGE: present  FETAL HEART RATE DECELERATIONS: none    ABDOMINAL PAIN/CRAMPING/CONTRACTIONS     Pt complaint of abdominal pain. Contractions irregular, palpated mild. Resting tone soft. Abdomen nontender    RUPTURE OF MEMBRANES OR LEAKING OF AMNIOTIC FLUID     Patient denies ROM or leaking of amniotic fluid.    VAGINAL BLEEDING     Patient denies vaginal bleeding.    VAGINAL EXAM     DILATION:  0  STATION:  -3  EFFACEMENT:   0  PRESENTATION:      VAGINAL EXAM DEFERRED DUE TO:        PAIN PRESENT ON ARRIVAL     ONSET:   4/3/24  LOCATION:  RLQ    PAIN SCALE (0-10):  5  DESCRIPTION: sharp    Interventions     Oral fluids given and tolerated.      PATIENT DISPOSITION     UA, FFN. US for BPP & placenta.      Dr. Jordan notified at 1600 of the above assessment.    Eunice Herrera, RN  [facility]  04/06/2024
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
## 2024-04-07 NOTE — DISCHARGE INSTRUCTIONS
Keep your scheduled appointment with your provider.    Call your Doctor if you have any of the following:  Temperature above 100 degrees  Nausea, vomiting and/or diarrhea  Severe headache, dizziness, or blurred vision  Notable increase in swelling of hands or feet  Notable swelling of face and lips  Difficulty, pain or burning with urination  Foul smelling vaginal discharge  Decreased fetal movement    Come to the hospital if you have any of the following symptoms:  Your water breaks  More than 4-6 contractions in 1 hour for 2 or more hours  Vaginal bleeding like a period    After 28 weeks, you should feel 10 distinct fetal movements within a 2 hour period.    It is recommended that you drink 1/2 a gallon of water each day.  Tea, Soda and Juice are  in addition to this.    Labor and Delivery Phone number: [phone]    If you need to be seen on Labor and delivery between the hours of 6pm and 7am, please enter through the Emergency room entrance.

## 2024-04-23 ENCOUNTER — HOSPITAL ENCOUNTER (OUTPATIENT)
Facility: HOSPITAL | Age: 29
Discharge: HOME | End: 2024-04-23
Attending: OBSTETRICS & GYNECOLOGY | Admitting: OBSTETRICS & GYNECOLOGY
Payer: MEDICAID

## 2024-04-23 VITALS
RESPIRATION RATE: 17 BRPM | TEMPERATURE: 99 F | WEIGHT: 178 LBS | DIASTOLIC BLOOD PRESSURE: 61 MMHG | HEART RATE: 112 BPM | BODY MASS INDEX: 28.61 KG/M2 | HEIGHT: 66 IN | SYSTOLIC BLOOD PRESSURE: 126 MMHG | OXYGEN SATURATION: 100 %

## 2024-04-23 DIAGNOSIS — R10.9 ABDOMINAL PAIN: ICD-10-CM

## 2024-04-23 LAB
BASOPHILS # BLD AUTO: 0.04 K/UL (ref 0–0.2)
BASOPHILS NFR BLD: 0.4 % (ref 0–1.9)
BILIRUB UR QL STRIP: NEGATIVE
CLARITY UR: CLEAR
COLOR UR: COLORLESS
DIFFERENTIAL METHOD BLD: ABNORMAL
EOSINOPHIL # BLD AUTO: 0 K/UL (ref 0–0.5)
EOSINOPHIL NFR BLD: 0 % (ref 0–8)
ERYTHROCYTE [DISTWIDTH] IN BLOOD BY AUTOMATED COUNT: 14 % (ref 11.5–14.5)
GLUCOSE UR QL STRIP: NEGATIVE
HCT VFR BLD AUTO: 29.7 % (ref 37–48.5)
HGB BLD-MCNC: 9.5 G/DL (ref 12–16)
HGB UR QL STRIP: NEGATIVE
IMM GRANULOCYTES # BLD AUTO: 0.06 K/UL (ref 0–0.04)
IMM GRANULOCYTES NFR BLD AUTO: 0.5 % (ref 0–0.5)
KETONES UR QL STRIP: NEGATIVE
LEUKOCYTE ESTERASE UR QL STRIP: NEGATIVE
LYMPHOCYTES # BLD AUTO: 0.5 K/UL (ref 1–4.8)
LYMPHOCYTES NFR BLD: 4.5 % (ref 18–48)
MCH RBC QN AUTO: 28.2 PG (ref 27–31)
MCHC RBC AUTO-ENTMCNC: 32 G/DL (ref 32–36)
MCV RBC AUTO: 88 FL (ref 82–98)
MONOCYTES # BLD AUTO: 0.9 K/UL (ref 0.3–1)
MONOCYTES NFR BLD: 8 % (ref 4–15)
NEUTROPHILS # BLD AUTO: 9.8 K/UL (ref 1.8–7.7)
NEUTROPHILS NFR BLD: 86.6 % (ref 38–73)
NITRITE UR QL STRIP: NEGATIVE
NRBC BLD-RTO: 0 /100 WBC
PH UR STRIP: 7 [PH] (ref 5–8)
PLATELET # BLD AUTO: 220 K/UL (ref 150–450)
PMV BLD AUTO: 9.4 FL (ref 9.2–12.9)
PROT UR QL STRIP: NEGATIVE
RBC # BLD AUTO: 3.37 M/UL (ref 4–5.4)
SP GR UR STRIP: <1.005 (ref 1–1.03)
URN SPEC COLLECT METH UR: ABNORMAL
UROBILINOGEN UR STRIP-ACNC: NEGATIVE EU/DL
WBC # BLD AUTO: 11.28 K/UL (ref 3.9–12.7)

## 2024-04-23 PROCEDURE — 81003 URINALYSIS AUTO W/O SCOPE: CPT | Performed by: OBSTETRICS & GYNECOLOGY

## 2024-04-23 PROCEDURE — 36415 COLL VENOUS BLD VENIPUNCTURE: CPT | Performed by: OBSTETRICS & GYNECOLOGY

## 2024-04-23 PROCEDURE — 85025 COMPLETE CBC W/AUTO DIFF WBC: CPT | Performed by: OBSTETRICS & GYNECOLOGY

## 2024-04-23 PROCEDURE — 99211 OFF/OP EST MAY X REQ PHY/QHP: CPT

## 2024-04-23 NOTE — NURSING
[facility]  Department of Obstetrics and Gynecology  Labor & Delivery Triage Assessment    PATIENT NAME: Kayla Marie Schoenberger  MRN: [id number]  TODAY'S DATE: 2024    CHIEF COMPLAINT: Abdominal Pain and Contractions      OB History    Para Term  AB Living   4 3 3 0 0 3   SAB IAB Ectopic Multiple Live Births   0 0 0 0 3      # Outcome Date GA Lbr Manpreet/2nd Weight Sex Type Anes PTL Lv   4 Current            3 Term     F CS-LTranv   MEHREEN   2 Term 20 39w6d 00:28 / 00:07 3.395 kg (7 lb 7.8 oz) F Vag-Spont None N MEHREEN      Name: SCHOENBERGER,GIRL JESSICA      Apgar1: 8  Apgar5: 8   1 Term    3.912 kg (8 lb 10 oz) F Vag-Spont  N MEHREEN     Past Medical History:   Diagnosis Date    Hard of hearing     Herpes genitalis in women     Scarlet fever      Past Surgical History:   Procedure Laterality Date     SECTION           VITAL SIGNS - ABNORMAL VITALS INCLUDE TEMP >100.4,RR <12 or >26, SUSTAINED MATERNAL PULSE <60 or >120     VITAL SIGNS (Most Recent)  Temp: 99.6 °F (37.6 °C) (24)  Pulse: (!) 111 (24)  Resp: 18 (24)  BP: (!) 125/57 (24)  SpO2: 97 % (24)    VITAL SIGNS     normal  HEADACHE    no     VOMITING    no  VISUAL DISTURBANCES  no  EPIGASTRIC PAIN        no  PROTEINURIA 2+ or MORE             no   EDEMA FACE/EXTREMITIES            no    FETAL MOVEMENT     FETAL MOVEMENT: Active  FETAL HEART RATE BASELINE =  160  normal  FETAL HEART RATE VARIABILITY:  Moderate  FETAL HEART RATE ACCELERATIONS FOR GESTATIONAL AGE: present  FETAL HEART RATE DECELERATIONS: none    ABDOMINAL PAIN/CRAMPING/CONTRACTIONS     Patient is complaining of abdominal pain/cramping/contractions. Contraction pattern is regular, < or = 5 minutes apart.   Resting tone is relaxed. Abdominal palpation is non-tender.    RUPTURE OF MEMBRANES OR LEAKING OF AMNIOTIC FLUID     Patient denies ROM or leaking of amniotic fluid.    VAGINAL BLEEDING     Patient  denies vaginal bleeding.    VAGINAL EXAM     DILATION:    STATION:    EFFACEMENT:    PRESENTATION:      VAGINAL EXAM DEFERRED DUE TO:   Incoming nurse  to do    PAIN PRESENT ON ARRIVAL     ONSET:   2-3pm at home  LOCATION:  Mid and lower abdominal  PAIN SCALE (0-10):  10  DESCRIPTION: Cramping, tightness    Interventions     None.      PATIENT DISPOSITION     In observation      Dr. Corrigan notified at 1829 of the above assessment.  1830: dr. Asher returned call. Do vag. Exam, may go home if cervix is close and follow up with Dr. Selby in [facility]. Refer Vag. Exam to Dr. Asher.     Shantal Sue, RN  [facility]  04/23/2024

## 2024-04-24 NOTE — NURSING
"1835: Report received from KARMA Nur.     1858: SVE completed. 1/30/-3. Pt tearful, stated "having a hard time finding a place to stay."  consult offered, pt politely declined.     1925: Updated Dr. Asher on SVE, maternal and fetal tachycardia, maternal temp, and UA results. Orders received for CBC with diff and continued PO hydration.     2032: Updated Dr. Asher on CBC results. Orders received to recheck temp and if below 100.0 dc home with instructions to follow up with primary OB tomorrow morning to schedule appt.     2038: Dc education given, pt reports no further questions at this time.     2106: Pt ambulated off of unit with partner.   "

## 2024-04-24 NOTE — DISCHARGE INSTRUCTIONS
Keep your scheduled appointment with your provider.    Call your Doctor if you have any of the following:  Temperature above 100 degrees  Nausea, vomiting and/or diarrhea  Severe headache, dizziness, or blurred vision  Notable increase in swelling of hands or feet  Notable swelling of face and lips  Difficulty, pain or burning with urination  Foul smelling vaginal discharge  Decreased fetal movement    Come to the hospital if you have any of the following symptoms:  Your water breaks  More than 4-6 contractions in 1 hour for 2 or more hours  Vaginal bleeding like a period    After 28 weeks, you should feel 10 distinct fetal movements within a 2 hour period.    It is recommended that you drink 1/2 a gallon of water each day.  Tea, Soda and Juice are  in addition to this.    Labor and Delivery Phone number: [phone]    If you need to be seen on Labor and delivery between the hours of 6pm and 7am, please enter through the Emergency room entrance.

## 2024-04-26 ENCOUNTER — HOSPITAL ENCOUNTER (OUTPATIENT)
Facility: HOSPITAL | Age: 29
Discharge: HOME | End: 2024-04-27
Attending: GENERAL PRACTICE | Admitting: GENERAL PRACTICE
Payer: MEDICAID

## 2024-04-26 DIAGNOSIS — O26.899 ABDOMINAL PAIN AFFECTING PREGNANCY: ICD-10-CM

## 2024-04-26 DIAGNOSIS — R10.9 ABDOMINAL PAIN AFFECTING PREGNANCY: ICD-10-CM

## 2024-04-26 LAB
ALBUMIN SERPL BCP-MCNC: 3.2 G/DL (ref 3.5–5.2)
ALP SERPL-CCNC: 81 U/L (ref 55–135)
ALT SERPL W/O P-5'-P-CCNC: 9 U/L (ref 10–44)
ANION GAP SERPL CALC-SCNC: 8 MMOL/L (ref 8–16)
AST SERPL-CCNC: 17 U/L (ref 10–40)
BASOPHILS # BLD AUTO: 0.03 K/UL (ref 0–0.2)
BASOPHILS NFR BLD: 0.4 % (ref 0–1.9)
BILIRUB SERPL-MCNC: 0.3 MG/DL (ref 0.1–1)
BUN SERPL-MCNC: 3 MG/DL (ref 6–20)
CALCIUM SERPL-MCNC: 8 MG/DL (ref 8.7–10.5)
CHLORIDE SERPL-SCNC: 105 MMOL/L (ref 95–110)
CO2 SERPL-SCNC: 22 MMOL/L (ref 23–29)
CREAT SERPL-MCNC: 0.5 MG/DL (ref 0.5–1.4)
DIFFERENTIAL METHOD BLD: ABNORMAL
EOSINOPHIL # BLD AUTO: 0 K/UL (ref 0–0.5)
EOSINOPHIL NFR BLD: 0 % (ref 0–8)
ERYTHROCYTE [DISTWIDTH] IN BLOOD BY AUTOMATED COUNT: 14 % (ref 11.5–14.5)
EST. GFR  (NO RACE VARIABLE): >60 ML/MIN/1.73 M^2
GLUCOSE SERPL-MCNC: 109 MG/DL (ref 70–110)
HCT VFR BLD AUTO: 30 % (ref 37–48.5)
HGB BLD-MCNC: 9.7 G/DL (ref 12–16)
IMM GRANULOCYTES # BLD AUTO: 0.03 K/UL (ref 0–0.04)
IMM GRANULOCYTES NFR BLD AUTO: 0.4 % (ref 0–0.5)
LYMPHOCYTES # BLD AUTO: 0.9 K/UL (ref 1–4.8)
LYMPHOCYTES NFR BLD: 11 % (ref 18–48)
MCH RBC QN AUTO: 27.7 PG (ref 27–31)
MCHC RBC AUTO-ENTMCNC: 32.3 G/DL (ref 32–36)
MCV RBC AUTO: 86 FL (ref 82–98)
MONOCYTES # BLD AUTO: 0.5 K/UL (ref 0.3–1)
MONOCYTES NFR BLD: 6.4 % (ref 4–15)
NEUTROPHILS # BLD AUTO: 6.6 K/UL (ref 1.8–7.7)
NEUTROPHILS NFR BLD: 81.8 % (ref 38–73)
NRBC BLD-RTO: 0 /100 WBC
PLATELET # BLD AUTO: 242 K/UL (ref 150–450)
PMV BLD AUTO: 9.5 FL (ref 9.2–12.9)
POTASSIUM SERPL-SCNC: 3.4 MMOL/L (ref 3.5–5.1)
PROT SERPL-MCNC: 6.3 G/DL (ref 6–8.4)
RBC # BLD AUTO: 3.5 M/UL (ref 4–5.4)
SODIUM SERPL-SCNC: 135 MMOL/L (ref 136–145)
WBC # BLD AUTO: 8 K/UL (ref 3.9–12.7)

## 2024-04-26 PROCEDURE — 85025 COMPLETE CBC W/AUTO DIFF WBC: CPT | Performed by: GENERAL PRACTICE

## 2024-04-26 PROCEDURE — 80053 COMPREHEN METABOLIC PANEL: CPT | Performed by: GENERAL PRACTICE

## 2024-04-26 PROCEDURE — 87086 URINE CULTURE/COLONY COUNT: CPT | Performed by: GENERAL PRACTICE

## 2024-04-26 PROCEDURE — 25000003 PHARM REV CODE 250: Performed by: GENERAL PRACTICE

## 2024-04-26 PROCEDURE — 99211 OFF/OP EST MAY X REQ PHY/QHP: CPT

## 2024-04-26 PROCEDURE — 63600175 PHARM REV CODE 636 W HCPCS: Performed by: GENERAL PRACTICE

## 2024-04-26 RX ORDER — POTASSIUM CHLORIDE 20 MEQ/1
40 TABLET, EXTENDED RELEASE ORAL ONCE
Status: COMPLETED | OUTPATIENT
Start: 2024-04-26 | End: 2024-04-26

## 2024-04-26 RX ORDER — ACETAMINOPHEN 500 MG
1000 TABLET ORAL EVERY 6 HOURS PRN
Status: DISCONTINUED | OUTPATIENT
Start: 2024-04-26 | End: 2024-04-27

## 2024-04-26 RX ORDER — SODIUM CHLORIDE, SODIUM LACTATE, POTASSIUM CHLORIDE, CALCIUM CHLORIDE 600; 310; 30; 20 MG/100ML; MG/100ML; MG/100ML; MG/100ML
INJECTION, SOLUTION INTRAVENOUS CONTINUOUS
Status: DISCONTINUED | OUTPATIENT
Start: 2024-04-26 | End: 2024-04-27

## 2024-04-26 RX ADMIN — SODIUM CHLORIDE, POTASSIUM CHLORIDE, SODIUM LACTATE AND CALCIUM CHLORIDE: 600; 310; 30; 20 INJECTION, SOLUTION INTRAVENOUS at 09:04

## 2024-04-26 RX ADMIN — POTASSIUM CHLORIDE 40 MEQ: 1500 TABLET, EXTENDED RELEASE ORAL at 10:04

## 2024-04-26 RX ADMIN — ACETAMINOPHEN 1000 MG: 500 TABLET ORAL at 09:04

## 2024-04-27 VITALS
RESPIRATION RATE: 16 BRPM | TEMPERATURE: 98 F | SYSTOLIC BLOOD PRESSURE: 116 MMHG | HEART RATE: 94 BPM | OXYGEN SATURATION: 99 % | DIASTOLIC BLOOD PRESSURE: 65 MMHG

## 2024-04-27 NOTE — NURSING
[facility]  Department of Obstetrics and Gynecology  Labor & Delivery Triage Assessment    PATIENT NAME: Kayla Marie Schoenberger  MRN: [id number]  TODAY'S DATE: 2024    CHIEF COMPLAINT: No chief complaint on file.      OB History    Para Term  AB Living   4 3 3 0 0 3   SAB IAB Ectopic Multiple Live Births   0 0 0 0 3      # Outcome Date GA Lbr Manpreet/2nd Weight Sex Type Anes PTL Lv   4 Current            3 Term     F CS-LTranv   MEHREEN   2 Term 20 39w6d 00:28 / 00:07 3.395 kg (7 lb 7.8 oz) F Vag-Spont None N MEHREEN      Name: SCHOENBERGER,GIRL KAYLA      Apgar1: 8  Apgar5: 8   1 Term    3.912 kg (8 lb 10 oz) F Vag-Spont  N MEHREEN     Past Medical History:   Diagnosis Date    Hard of hearing     Herpes genitalis in women     Scarlet fever      Past Surgical History:   Procedure Laterality Date     SECTION           VITAL SIGNS - ABNORMAL VITALS INCLUDE TEMP >100.4,RR <12 or >26, SUSTAINED MATERNAL PULSE <60 or >120     VITAL SIGNS (Most Recent)  Temp: 98.4 °F (36.9 °C) (24)  Pulse: 94 (24)  Resp: 16 (24)  BP: 116/65 (24)  SpO2: 99 % (24)    VITAL SIGNS     normal  HEADACHE    no     VOMITING    no  VISUAL DISTURBANCES  no  EPIGASTRIC PAIN        no  PROTEINURIA 2+ or MORE             no   EDEMA FACE/EXTREMITIES            no    FETAL MOVEMENT     FETAL MOVEMENT: Active  FETAL HEART RATE BASELINE =  140  normal  FETAL HEART RATE VARIABILITY:  Moderate  FETAL HEART RATE ACCELERATIONS FOR GESTATIONAL AGE: present  FETAL HEART RATE DECELERATIONS: none    ABDOMINAL PAIN/CRAMPING/CONTRACTIONS     Patient is complaining of abdominal pain/cramping/contractions. For  patients  2  contractions/hour. Contraction strength is mild. Resting tone is relaxed. Abdominal palpation is tender.    RUPTURE OF MEMBRANES OR LEAKING OF AMNIOTIC FLUID     Patient denies ROM or leaking of amniotic fluid.    VAGINAL BLEEDING     Patient  denies vaginal bleeding.    VAGINAL EXAM     DILATION:  \  STATION:  \  EFFACEMENT:  \  PRESENTATION:  \    VAGINAL EXAM DEFERRED DUE TO:    not in labor    PAIN PRESENT ON ARRIVAL     ONSET:   1100  LOCATION:  Upper abd and neck pain   PAIN SCALE (0-10):  10  DESCRIPTION: Neck ache and abd pain with movement    Interventions     Acetaminophen given.  Intravenous LR given.      PATIENT DISPOSITION     Discharged Home      Dr. chambers notified at  of the above assessment.    Bree Lipscomb RN  [facility]  2024

## 2024-04-27 NOTE — NURSING
Patient up to L&d in w/c with c/o upper abd pain that is constant since 1100, and neck pain that started just prior to arriving to hospital. Denies vaginal bleeding, denies leaking of fluid, reports good Fm. Rn oriented patient to room 2301, instructed to collect urine midstream specimen. Change to patient gown.

## 2024-04-27 NOTE — NURSING
Rn assessed patient's abd pain, and noted patient is tender to palpation and patient reports it hurts more with moving. Patient Is reporting bad right side neck pain.     2038 rn called to dr. Layton and gave report that patient is unreffered and seeing an ob for [facility]. Patient staying in [place]. Reported patient's prenatal hx presenting c/o, md reviewed efm/toco patterns, vss, afebrile. Rn rec'd order to give tylenol 1 gm, LR 500ml IVB, collect cmp and cbc, and ua.    2245 rn called MD with report update. Patient feeling a little relief, occassional uterine contractions and md also reviewed the lab results and ordered potassium po 40 meq now and to discharge home.    2310 IV stopped and removed catheter intact. Placed pressure gauze with bandaid. Gave patient discharge instructions and included to go to [facility] for non emergency complaints and to go to nearest hospital for emergency.

## 2024-04-27 NOTE — DISCHARGE INSTRUCTIONS
Keep your scheduled appointment with your provider.    Call your Doctor if you have any of the following:  Temperature above 100 degrees  Nausea, vomiting and/or diarrhea  Severe headache, dizziness, or blurred vision  Notable increase in swelling of hands or feet  Notable swelling of face and lips  Difficulty, pain or burning with urination  Foul smelling vaginal discharge  Decreased fetal movement    Come to the hospital if you have any of the following symptoms:  Your water breaks  More than 4-6 contractions in 1 hour for 2 or more hours  Vaginal bleeding like a period    After 28 weeks, you should feel 10 distinct fetal movements within a 2 hour period.    It is recommended that you drink 1/2 a gallon of water each day.  Tea, Soda and Juice are  in addition to this.    Labor and Delivery Phone number: [phone]    If you need to be seen on Labor and delivery between the hours of 6pm and 7am, please enter through the Emergency room entrance.

## 2024-04-29 LAB — BACTERIA UR CULT: NO GROWTH

## 2024-06-06 ENCOUNTER — PATIENT OUTREACH (OUTPATIENT)
Dept: EMERGENCY MEDICINE | Facility: HOSPITAL | Age: 29
End: 2024-06-06